# Patient Record
Sex: MALE | Race: WHITE | NOT HISPANIC OR LATINO | Employment: FULL TIME | URBAN - METROPOLITAN AREA
[De-identification: names, ages, dates, MRNs, and addresses within clinical notes are randomized per-mention and may not be internally consistent; named-entity substitution may affect disease eponyms.]

---

## 2017-03-21 ENCOUNTER — OFFICE VISIT (OUTPATIENT)
Dept: URGENT CARE | Facility: PHYSICIAN GROUP | Age: 32
End: 2017-03-21
Payer: COMMERCIAL

## 2017-03-21 VITALS
HEIGHT: 72 IN | HEART RATE: 90 BPM | BODY MASS INDEX: 25.6 KG/M2 | RESPIRATION RATE: 16 BRPM | TEMPERATURE: 99.1 F | DIASTOLIC BLOOD PRESSURE: 80 MMHG | WEIGHT: 189 LBS | OXYGEN SATURATION: 98 % | SYSTOLIC BLOOD PRESSURE: 110 MMHG

## 2017-03-21 DIAGNOSIS — J01.00 ACUTE NON-RECURRENT MAXILLARY SINUSITIS: ICD-10-CM

## 2017-03-21 DIAGNOSIS — J40 BRONCHITIS: ICD-10-CM

## 2017-03-21 PROCEDURE — 99203 OFFICE O/P NEW LOW 30 MIN: CPT | Performed by: PHYSICIAN ASSISTANT

## 2017-03-21 RX ORDER — DOXYCYCLINE HYCLATE 100 MG
100 TABLET ORAL 2 TIMES DAILY
Qty: 20 TAB | Refills: 0 | Status: SHIPPED | OUTPATIENT
Start: 2017-03-21 | End: 2017-03-31

## 2017-03-21 RX ORDER — CODEINE PHOSPHATE AND GUAIFENESIN 10; 100 MG/5ML; MG/5ML
5 SOLUTION ORAL EVERY 4 HOURS PRN
Qty: 100 ML | Refills: 0 | Status: SHIPPED | OUTPATIENT
Start: 2017-03-21 | End: 2017-06-12

## 2017-03-21 ASSESSMENT — ENCOUNTER SYMPTOMS
SPUTUM PRODUCTION: 1
FEVER: 0
COUGH: 1
SHORTNESS OF BREATH: 0
PALPITATIONS: 0
WHEEZING: 0
HEMOPTYSIS: 0
SORE THROAT: 0
CHILLS: 0

## 2017-03-21 NOTE — PROGRESS NOTES
Subjective:      Barbara Bennett is a 31 y.o. male who presents with Cough            Cough  This is a new problem. Episode onset: 14 days ago. The problem has been gradually worsening. The problem occurs constantly. The cough is productive of sputum. Associated symptoms include ear congestion. Pertinent negatives include no chest pain, chills, ear pain, fever, hemoptysis, sore throat, shortness of breath or wheezing. Nothing aggravates the symptoms. He has tried OTC cough suppressant for the symptoms. The treatment provided no relief.       Review of Systems   Constitutional: Positive for malaise/fatigue. Negative for fever and chills.   HENT: Positive for congestion. Negative for ear pain and sore throat.    Respiratory: Positive for cough and sputum production. Negative for hemoptysis, shortness of breath and wheezing.    Cardiovascular: Negative for chest pain and palpitations.     All other systems reviewed and are negative.  PMH:  has no past medical history of Diabetes or ASTHMA.  MEDS:   Current outpatient prescriptions:   •  guaifenesin-codeine (CHERATUSSIN AC) Solution oral solution, Take 5 mL by mouth every four hours as needed for Cough., Disp: 100 mL, Rfl: 0  •  PRILOSEC 20 MG CPDR, TAKE (1) CAPSULE BY MOUTH ONCE DAILY, Disp: 30 Each, Rfl: 6  •  omeprazole (PRILOSEC) 20 MG CPDR, Take 20 mg by mouth every day., Disp: , Rfl:   •  hydrocodone-acetaminophen (VICODIN) 5-500 MG TABS, Take 1-2 Tabs by mouth every 6 hours as needed., Disp: 45 Each, Rfl: 0  •  VICODIN 5-500 MG TABS, TAKE 1-2 TABLETS BY MOUTH EVERY 4 HOURS AS NEEDED FOR PAIN., Disp: 45 Each, Rfl: 0  •  omeprazole (PRILOSEC) 20 MG CPDR, Take 1 Cap by mouth every day., Disp: 90 Cap, Rfl: 3  •  promethazine-codeine (PHENERGAN-CODEINE) 6.25-10 MG/5ML SYRP, Take 5 mL by mouth 4 times a day as needed for Cough., Disp: 120 mL, Rfl: 0  •  azithromycin (ZITHROMAX) 250 MG TABS, Take  by mouth every day. 2 tabs by mouth day 1, 1 tab by mouth days 2-5, Disp: 1  Each, Rfl: 0  ALLERGIES: No Known Allergies  SURGHX: History reviewed. No pertinent past surgical history.  SOCHX:  reports that he has been smoking.  He does not have any smokeless tobacco history on file. He reports that he does not drink alcohol.  FH: Family history was reviewed, no pertinent findings to report  Medications, Allergies, and current problem list reviewed today in Epic       Objective:     /80 mmHg  Pulse 90  Temp(Src) 37.3 °C (99.1 °F)  Resp 16  Ht 1.829 m (6')  Wt 85.73 kg (189 lb)  BMI 25.63 kg/m2  SpO2 98%     Physical Exam   Constitutional: He is oriented to person, place, and time. He appears well-developed and well-nourished.   HENT:   Head: Normocephalic and atraumatic.   Right Ear: Hearing, tympanic membrane, external ear and ear canal normal.   Left Ear: Hearing, tympanic membrane, external ear and ear canal normal.   Nose: Nose normal.   Mouth/Throat: Uvula is midline, oropharynx is clear and moist and mucous membranes are normal.   Neck: Normal range of motion. Neck supple.   Cardiovascular: Normal rate, regular rhythm and normal heart sounds.  Exam reveals no gallop and no friction rub.    No murmur heard.  Pulmonary/Chest: Effort normal and breath sounds normal. No accessory muscle usage. No apnea, no tachypnea and no bradypnea. No respiratory distress. He has no decreased breath sounds. He has no wheezes. He has no rhonchi. He has no rales. He exhibits no tenderness.   Neurological: He is alert and oriented to person, place, and time.   Skin: Skin is warm and dry.   Psychiatric: He has a normal mood and affect. His behavior is normal. Judgment and thought content normal.   Vitals reviewed.              Assessment/Plan:     1. Acute non-recurrent maxillary sinusitis  doxycycline (VIBRAMYCIN) 100 MG Tab   2. Bronchitis  guaifenesin-codeine (CHERATUSSIN AC) Solution oral solution    doxycycline (VIBRAMYCIN) 100 MG Tab       Differential diagnosis, natural history, supportive  care, and indications for immediate follow-up discussed at length.   Follow-up with primary care provider within 4-5 days, emergency room precautions discussed.  Patient and/or family appears understanding of information.

## 2017-03-21 NOTE — MR AVS SNAPSHOT
Barbara Bennett   3/21/2017 10:50 AM   Office Visit   MRN: [id number]    Department:  [facility]   Dept Phone:  [phone]    Description:  Male : 1985   Provider:  Kade Grier PA-C           Reason for Visit     Cough chest congestion, nasal drainage, bloody noses, dizziness, vomiting, chills X 2 weeks       Allergies as of 3/21/2017     No Known Allergies      You were diagnosed with     Acute non-recurrent maxillary sinusitis   [6419620]       Bronchitis   [[id number]]         Vital Signs     Blood Pressure Pulse Temperature Respirations Height Weight    110/80 mmHg 90 37.3 °C (99.1 °F) 16 1.829 m (6') 85.73 kg (189 lb)    Body Mass Index Oxygen Saturation Smoking Status             25.63 kg/m2 98% Current Every Day Smoker         Basic Information     Date Of Birth Sex Race Ethnicity Preferred Language    1985 Male White Non- English      Health Maintenance        Date Due Completion Dates    IMM DTaP/Tdap/Td Vaccine (1 - Tdap) 2003 ---    IMM INFLUENZA (1) 2016 ---            Current Immunizations     No immunizations on file.      Below and/or attached are the medications your provider expects you to take. Review all of your home medications and newly ordered medications with your provider and/or pharmacist. Follow medication instructions as directed by your provider and/or pharmacist. Please keep your medication list with you and share with your provider. Update the information when medications are discontinued, doses are changed, or new medications (including over-the-counter products) are added; and carry medication information at all times in the event of emergency situations     Allergies:  No Known Allergies          Medications  Valid as of: 2017 - 11:35 AM    Generic Name Brand Name Tablet Size Instructions for use    Azithromycin (Tab) ZITHROMAX 250 MG Take  by mouth every day. 2 tabs by mouth day 1, 1 tab by mouth days 2-5        Doxycycline  Hyclate (Tab) VIBRAMYCIN 100 MG Take 1 Tab by mouth 2 times a day for 10 days.        Guaifenesin-Codeine (Solution) ROBITUSSIN -10 mg/5mL Take 5 mL by mouth every four hours as needed for Cough.        Hydrocodone-Acetaminophen (Tab) VICODIN 5-500 MG TAKE 1-2 TABLETS BY MOUTH EVERY 4 HOURS AS NEEDED FOR PAIN.        Hydrocodone-Acetaminophen (Tab) VICODIN 5-500 MG Take 1-2 Tabs by mouth every 6 hours as needed.        Omeprazole (CAPSULE DELAYED RELEASE) PRILOSEC 20 MG Take 20 mg by mouth every day.        Omeprazole (CAPSULE DELAYED RELEASE) PRILOSEC 20 MG Take 1 Cap by mouth every day.        Omeprazole (CAPSULE DELAYED RELEASE) PRILOSEC 20 MG TAKE (1) CAPSULE BY MOUTH ONCE DAILY        Promethazine-Codeine (Syrup) PHENERGAN-CODEINE 6.25-10 MG/5ML Take 5 mL by mouth 4 times a day as needed for Cough.        .                 Medicines prescribed today were sent to:     [id number] - [place] - [address]    [address] [place] [address]    Phone: [phone] Fax: [phone]    Open 24 Hours?: No    [facility] #[id number] - [place] - [address]    [address] [address]    Phone: [phone] Fax: [phone]    Open 24 Hours?: No      Medication refill instructions:       If your prescription bottle indicates you have medication refills left, it is not necessary to call your provider’s office. Please contact your pharmacy and they will refill your medication.    If your prescription bottle indicates you do not have any refills left, you may request refills at any time through one of the following ways: The online [employer] system (except Urgent Care), by calling your provider’s office, or by asking your pharmacy to contact your provider’s office with a refill request. Medication refills are processed only during regular business hours and may not be available until the next business day. Your provider may request additional information or to have a follow-up visit  with you prior to refilling your medication.   *Please Note: Medication refills are assigned a new Rx number when refilled electronically. Your pharmacy may indicate that no refills were authorized even though a new prescription for the same medication is available at the pharmacy. Please request the medicine by name with the pharmacy before contacting your provider for a refill.           Localler Access Code: [email or web address]  Expires: 4/20/2017 11:35 AM    Localler  A secure, online tool to manage your health information     [employer]s Localler® is a secure, online tool that connects you to your personalized health information from the privacy of your home -- day or night - making it very easy for you to manage your healthcare. Once the activation process is completed, you can even access your medical information using the Localler genoveva, which is available for free in the [employer] Genoveva store or Google Play store.     Localler provides the following levels of access (as shown below):   My Chart Features   [facility] Primary Care Doctor [facility]  Specialists [facility]  Urgent  Care Non-[facility]  Primary Care  Doctor   Email your healthcare team securely and privately 24/7 X X X    Manage appointments: schedule your next appointment; view details of past/upcoming appointments X      Request prescription refills. X      View recent personal medical records, including lab and immunizations X X X X   View health record, including health history, allergies, medications X X X X   Read reports about your outpatient visits, procedures, consult and ER notes X X X X   See your discharge summary, which is a recap of your hospital and/or ER visit that includes your diagnosis, lab results, and care plan. X X       How to register for Localler:  1. Go to  [email or web address].  2. Click on the Sign Up Now box, which takes you to the New Member Sign Up page. You will need to provide the following information:  a. Enter your Localler Access  Code exactly as it appears at the top of this page. (You will not need to use this code after you’ve completed the sign-up process. If you do not sign up before the expiration date, you must request a new code.)   b. Enter your date of birth.   c. Enter your home email address.   d. Click Submit, and follow the next screen’s instructions.  3. Create a [employer] ID. This will be your Ohmconnect login ID and cannot be changed, so think of one that is secure and easy to remember.  4. Create a [employer] password. You can change your password at any time.  5. Enter your Password Reset Question and Answer. This can be used at a later time if you forget your password.   6. Enter your e-mail address. This allows you to receive e-mail notifications when new information is available in Ohmconnect.  7. Click Sign Up. You can now view your health information.    For assistance activating your Ohmconnect account, call [phone]        Quit Tobacco Information     Do you want to quit using tobacco?    Quitting tobacco decreases risks of cancer, heart and lung disease, increases life expectancy, improves sense of taste and smell, and increases spending money, among other benefits.    If you are thinking about quitting, we can help.  • [facility] Quit Tobacco Program: 130.392.8475  o Program occurs weekly for four weeks and includes pharmacist consultation on products to support quitting smoking or chewing tobacco. A provider referral is needed for pharmacist consultation.  • Tobacco Users Help Hotline: 3-800-QUIT-NOW (039-1623) or https://[place].quitlogix.org/  o Free, confidential telephone and online coaching for [place] residents. Sessions are designed on a schedule that is convenient for you. Eligible clients receive free nicotine replacement therapy.  • Nationally: www.smokefree.gov  o Information and professional assistance to support both immediate and long-term needs as you become, and remain, a non-smoker. Smokefree.gov allows you to  choose the help that best fits your needs.

## 2017-06-12 ENCOUNTER — APPOINTMENT (OUTPATIENT)
Dept: ADMISSIONS | Facility: MEDICAL CENTER | Age: 32
End: 2017-06-12
Attending: SURGERY
Payer: COMMERCIAL

## 2017-06-14 ENCOUNTER — HOSPITAL ENCOUNTER (OUTPATIENT)
Facility: MEDICAL CENTER | Age: 32
End: 2017-06-14
Attending: SURGERY | Admitting: SURGERY
Payer: COMMERCIAL

## 2017-06-14 VITALS
TEMPERATURE: 97.7 F | WEIGHT: 196.87 LBS | DIASTOLIC BLOOD PRESSURE: 70 MMHG | HEART RATE: 55 BPM | HEIGHT: 72 IN | SYSTOLIC BLOOD PRESSURE: 107 MMHG | OXYGEN SATURATION: 96 % | RESPIRATION RATE: 16 BRPM | BODY MASS INDEX: 26.67 KG/M2

## 2017-06-14 PROBLEM — M25.821: Status: ACTIVE | Noted: 2017-06-14

## 2017-06-14 PROCEDURE — A9270 NON-COVERED ITEM OR SERVICE: HCPCS

## 2017-06-14 PROCEDURE — 160002 HCHG RECOVERY MINUTES (STAT): Performed by: SURGERY

## 2017-06-14 PROCEDURE — 160029 HCHG SURGERY MINUTES - 1ST 30 MINS LEVEL 4: Performed by: SURGERY

## 2017-06-14 PROCEDURE — 700111 HCHG RX REV CODE 636 W/ 250 OVERRIDE (IP)

## 2017-06-14 PROCEDURE — 88304 TISSUE EXAM BY PATHOLOGIST: CPT

## 2017-06-14 PROCEDURE — 160036 HCHG PACU - EA ADDL 30 MINS PHASE I: Performed by: SURGERY

## 2017-06-14 PROCEDURE — 500881 HCHG PACK, EXTREMITY: Performed by: SURGERY

## 2017-06-14 PROCEDURE — 160048 HCHG OR STATISTICAL LEVEL 1-5: Performed by: SURGERY

## 2017-06-14 PROCEDURE — 160009 HCHG ANES TIME/MIN: Performed by: SURGERY

## 2017-06-14 PROCEDURE — 501480 HCHG STOCKINETTE: Performed by: SURGERY

## 2017-06-14 PROCEDURE — 502240 HCHG MISC OR SUPPLY RC 0272: Performed by: SURGERY

## 2017-06-14 PROCEDURE — 700102 HCHG RX REV CODE 250 W/ 637 OVERRIDE(OP)

## 2017-06-14 PROCEDURE — 501445 HCHG STAPLER, SKIN DISP: Performed by: SURGERY

## 2017-06-14 PROCEDURE — 700101 HCHG RX REV CODE 250

## 2017-06-14 PROCEDURE — 501838 HCHG SUTURE GENERAL: Performed by: SURGERY

## 2017-06-14 PROCEDURE — 160041 HCHG SURGERY MINUTES - EA ADDL 1 MIN LEVEL 4: Performed by: SURGERY

## 2017-06-14 PROCEDURE — 160035 HCHG PACU - 1ST 60 MINS PHASE I: Performed by: SURGERY

## 2017-06-14 RX ORDER — MAGNESIUM HYDROXIDE 1200 MG/15ML
LIQUID ORAL
Status: DISCONTINUED | OUTPATIENT
Start: 2017-06-14 | End: 2017-06-14 | Stop reason: HOSPADM

## 2017-06-14 RX ORDER — OXYCODONE HYDROCHLORIDE AND ACETAMINOPHEN 5; 325 MG/1; MG/1
TABLET ORAL
Status: COMPLETED
Start: 2017-06-14 | End: 2017-06-14

## 2017-06-14 RX ORDER — BUPIVACAINE HYDROCHLORIDE AND EPINEPHRINE 5; 5 MG/ML; UG/ML
INJECTION, SOLUTION EPIDURAL; INTRACAUDAL; PERINEURAL
Status: DISCONTINUED | OUTPATIENT
Start: 2017-06-14 | End: 2017-06-14 | Stop reason: HOSPADM

## 2017-06-14 RX ORDER — HYDROCODONE BITARTRATE AND ACETAMINOPHEN 5; 325 MG/1; MG/1
1-2 TABLET ORAL EVERY 4 HOURS PRN
Qty: 20 TAB | Refills: 0 | Status: SHIPPED | OUTPATIENT
Start: 2017-06-14 | End: 2018-10-11

## 2017-06-14 RX ORDER — OMEPRAZOLE 40 MG/1
40 CAPSULE, DELAYED RELEASE ORAL DAILY
COMMUNITY

## 2017-06-14 RX ORDER — CEPHALEXIN 500 MG/1
500 CAPSULE ORAL 3 TIMES DAILY
COMMUNITY
Start: 2017-06-06 | End: 2018-10-11

## 2017-06-14 RX ORDER — SODIUM CHLORIDE, SODIUM LACTATE, POTASSIUM CHLORIDE, CALCIUM CHLORIDE 600; 310; 30; 20 MG/100ML; MG/100ML; MG/100ML; MG/100ML
1000 INJECTION, SOLUTION INTRAVENOUS
Status: COMPLETED | OUTPATIENT
Start: 2017-06-14 | End: 2017-06-14

## 2017-06-14 RX ADMIN — SODIUM CHLORIDE, SODIUM LACTATE, POTASSIUM CHLORIDE, CALCIUM CHLORIDE 1000 ML: 600; 310; 30; 20 INJECTION, SOLUTION INTRAVENOUS at 14:43

## 2017-06-14 RX ADMIN — FENTANYL CITRATE 50 MCG: 50 INJECTION, SOLUTION INTRAMUSCULAR; INTRAVENOUS at 17:15

## 2017-06-14 RX ADMIN — OXYCODONE AND ACETAMINOPHEN 2 TABLET: 5; 325 TABLET ORAL at 17:00

## 2017-06-14 RX ADMIN — FENTANYL CITRATE 50 MCG: 50 INJECTION, SOLUTION INTRAMUSCULAR; INTRAVENOUS at 17:00

## 2017-06-14 ASSESSMENT — PAIN SCALES - GENERAL
PAINLEVEL_OUTOF10: 4
PAINLEVEL_OUTOF10: 0
PAINLEVEL_OUTOF10: 4

## 2017-06-14 NOTE — PROGRESS NOTES
Med rec complete per Pt at bedside  Allergies reviewed  Pt is currently on a 10 day course of Cephalexin started on 6/6

## 2017-06-14 NOTE — IP AVS SNAPSHOT
6/14/2017    Barbara Bennett  [address] [address]    Dear Barbara:    [facility] wants to ensure your discharge home is safe and you or your loved ones have had all of your questions answered regarding your care after you leave the hospital.    Below is a list of resources and contact information should you have any questions regarding your hospital stay, follow-up instructions, or active medical symptoms.    Questions or Concerns Regarding… Contact   Medical Questions Related to Your Discharge  (7 days a week, 8am-5pm) Contact a Nurse Care Coordinator   [phone]   Medical Questions Not Related to Your Discharge  (24 hours a day / 7 days a week)  Contact the Nurse Health Line   [phone]    Medications or Discharge Instructions Refer to your discharge packet   or contact your [facility] Primary Care Provider   [phone]   Follow-up Appointment(s) Schedule your appointment via [employer]le   or contact Scheduling [phone]   Billing Review your statement via [employer]  or contact Billing [phone]   Medical Records Review your records via [employer]   or contact Medical Records [phone]     You may receive a telephone call within two days of discharge. This call is to make certain you understand your discharge instructions and have the opportunity to have any questions answered. You can also easily access your medical information, test results and upcoming appointments via the [employer] free online health management tool. You can learn more and sign up at [employer] Doodle. For assistance setting up your L[employer] Doodle account, please call [phone].    Once again, we want to ensure your discharge home is safe and that you have a clear understanding of any next steps in your care. If you have any questions or concerns, please do not hesitate to contact us, we are here for you. Thank you for choosing [facility] for your healthcare needs.    Sincerely,    Your [facility] Healthcare Team

## 2017-06-14 NOTE — OR SURGEON
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    Anesthesiologist/Type of Anesthesia:  Anesthesiologist: Poncho Molina M.D./General    Surgical Staff:  Circulator: Makayla Crump R.N.  Scrub Person: Antonio Plascencia Jr.  First Assist: Darryl Fernandes PA-C    Specimen: none    Estimated Blood Loss: minimal    Findings: see dictation    Complications:  None noted.         6/14/2017 4:44 PM Bryce Min

## 2017-06-14 NOTE — IP AVS SNAPSHOT
Home Care Instructions                                                                                                                Name:Barbara Bennett  Medical Record Number:[id number]  CSN: [id number]    YOB: 1985   Age: 31 y.o.  Sex: male  HT:1.829 m (6') WT: 89.3 kg (196 lb 13.9 oz)          Admit Date: 6/14/2017     Discharge Date:   Today's Date: 6/14/2017  Attending Doctor:  JELLY Santos*                  Allergies:  Review of patient's allergies indicates no known allergies.                Discharge Instructions         ACTIVITY: Rest and take it easy for the first 24 hours.  A responsible adult is recommended to remain with you during that time.  It is normal to feel sleepy.  We encourage you to not do anything that requires balance, judgment or coordination.    MILD FLU-LIKE SYMPTOMS ARE NORMAL. YOU MAY EXPERIENCE GENERALIZED MUSCLE ACHES, THROAT IRRITATION, HEADACHE AND/OR SOME NAUSEA.    FOR 24 HOURS DO NOT:  Drive, operate machinery or run household appliances.  Drink beer or alcoholic beverages.   Make important decisions or sign legal documents.    SPECIAL INSTRUCTIONS:     DIET: To avoid nausea, slowly advance diet as tolerated, avoiding spicy or greasy foods for the first day.  Add more substantial food to your diet according to your physician's instructions.  Babies can be fed formula or breast milk as soon as they are hungry.  INCREASE FLUIDS AND FIBER TO AVOID CONSTIPATION.    SURGICAL DRESSING/BATHING:   Okay to remove bandage in 4 days and get incision wet and cover with bandaids.     Elevate above heart and ice frequently for at least 2 weeks. Encourage sedentary use of hand and frequent moving of fingers to prevent stiffness.     No heavy lifting or grasping for at least 4 weeks.     No driving while on narcotic pain medications. Contact auto-insurance carrier for clearance to begin driving again.     Call MD or come to ER for any major concerns.    FOLLOW-UP  APPOINTMENT:  Office will call you with follow up appointment. [facility] # is  [phone]    You should CALL YOUR PHYSICIAN if you develop:  Fever greater than 101 degrees F.  Pain not relieved by medication, or persistent nausea or vomiting.  Excessive bleeding (blood soaking through dressing) or unexpected drainage from the wound.  Extreme redness or swelling around the incision site, drainage of pus or foul smelling drainage.  Inability to urinate or empty your bladder within 8 hours.  Problems with breathing or chest pain.    You should call 911 if you develop problems with breathing or chest pain.  If you are unable to contact your doctor or surgical center, you should go to the nearest emergency room or urgent care center.  Physician's telephone #: Dr Min at the [facility] clinic.    If any questions arise, call your doctor.  If your doctor is not available, please feel free to call the Surgical Center at {Surgical Dept Numbers:[id number]}.  The Center is open Monday through Friday from 7AM to 7PM.  You can also call the HEALTH HOTLINE open 24 hours/day, 7 days/week and speak to a nurse at [phone], or toll free at [phone].    A registered nurse may call you a few days after your surgery to see how you are doing after your procedure.    MEDICATIONS: Resume taking daily medication.  Take prescribed pain medication with food.  If no medication is prescribed, you may take non-aspirin pain medication if needed.  PAIN MEDICATION CAN BE VERY CONSTIPATING.  Take a stool softener or laxative such as senokot, pericolace, or milk of magnesia if needed.    Prescription given for Norco.  Last pain medication given at 5:00pm.    If your physician has prescribed pain medication that includes Acetaminophen (Tylenol), do not take additional Acetaminophen (Tylenol) while taking the prescribed medication.    Depression / Suicide Risk    As you are discharged from this [facility] facility, it is important to learn  how to keep safe from harming yourself.    Recognize the warning signs:  · Abrupt changes in personality, positive or negative- including increase in energy   · Giving away possessions  · Change in eating patterns- significant weight changes-  positive or negative  · Change in sleeping patterns- unable to sleep or sleeping all the time   · Unwillingness or inability to communicate  · Depression  · Unusual sadness, discouragement and loneliness  · Talk of wanting to die  · Neglect of personal appearance   · Rebelliousness- reckless behavior  · Withdrawal from people/activities they love  · Confusion- inability to concentrate     If you or a loved one observes any of these behaviors or has concerns about self-harm, here's what you can do:  · Talk about it- your feelings and reasons for harming yourself  · Remove any means that you might use to hurt yourself (examples: pills, rope, extension cords, firearm)  · Get professional help from the community (Mental Health, Substance Abuse, psychological counseling)  · Do not be alone:Call your Safe Contact- someone whom you trust who will be there for you.  · Call your local CRISIS HOTLINE 436-7867 or 336-671-3308  · Call your local Children's Mobile Crisis Response Team [place] (369) 392-9419 or www.ClassLink  · Call the toll free National Suicide Prevention Hotlines   · National Suicide Prevention Lifeline 892-346-WLXX (0460)  · National Hope Line Network 800-SUICIDE (641-3690)       Medication List      START taking these medications        Instructions    Morning Afternoon Evening Bedtime    hydrocodone-acetaminophen 5-325 MG Tabs per tablet   Commonly known as:  NORCO        Take 1-2 Tabs by mouth every four hours as needed.   Dose:  1-2 Tab                          CONTINUE taking these medications        Instructions    Morning Afternoon Evening Bedtime    cephALEXin 500 MG Caps   Commonly known as:  KEFLEX        Take 500 mg by mouth 3 times a day. Pt  started a 10 day course on 6/6   Dose:  500 mg                        NAPROXEN PO        Take 1 Tab by mouth as needed.   Dose:  1 Tab                        omeprazole 40 MG delayed-release capsule   Commonly known as:  PRILOSEC        Take 40 mg by mouth every day.   Dose:  40 mg                             Where to Get Your Medications      You can get these medications from any pharmacy     Bring a paper prescription for each of these medications    - hydrocodone-acetaminophen 5-325 MG Tabs per tablet            Medication Information     Above and/or attached are the medications your physician expects you to take upon discharge. Review all of your home medications and newly ordered medications with your doctor and/or pharmacist. Follow medication instructions as directed by your doctor and/or pharmacist. Please keep your medication list with you and share with your physician. Update the information when medications are discontinued, doses are changed, or new medications (including over-the-counter products) are added; and carry medication information at all times in the event of emergency situations.        Resources     Quit Smoking / Tobacco Use:    I understand the use of any tobacco products increases my chance of suffering from future heart disease or stroke and could cause other illnesses which may shorten my life. Quitting the use of tobacco products is the single most important thing I can do to improve my health. For further information on smoking / tobacco cessation call a Toll Free Quit Line at 1-612.185.1940 (*[employer] [place]) or 1-685.219.7389 (American Lung Association) or you can access the web based program at www.lungusa.org.    [place] Tobacco Users Help Line:  (413) 258-5522       Toll Free: 1-180.283.8815  Quit Tobacco Program [facility] (010)396-0115    Crisis Hotline:    [place] Crisis Hotline:  9-016-JSUKKUI or 1-686.946.3180    [place] Crisis Hotline:     8-825-259-9035 or 481-648-8242    Discharge Survey:   Thank you for choosing [facility]. We hope we did everything we could to make your hospital stay a pleasant one. You may be receiving a survey and we would appreciate your time and participation in answering the questions. Your input is very valuable to us in our efforts to improve our service to our patients and their families.            Signatures     My signature on this form indicates that:    1. I acknowledge receipt and understanding of these Home Care Instruction.  2. My questions regarding this information have been answered to my satisfaction.  3. I have formulated a plan with my discharge nurse to obtain my prescribed medications for home.    __________________________________      __________________________________                   Patient Signature                                 Guardian/Responsible Adult Signature      __________________________________                 __________       ________                       Nurse Signature                                               Date                 Time

## 2017-06-15 NOTE — DISCHARGE INSTRUCTIONS
0  ACTIVITY: Rest and take it easy for the first 24 hours.  A responsible adult is recommended to remain with you during that time.  It is normal to feel sleepy.  We encourage you to not do anything that requires balance, judgment or coordination.    MILD FLU-LIKE SYMPTOMS ARE NORMAL. YOU MAY EXPERIENCE GENERALIZED MUSCLE ACHES, THROAT IRRITATION, HEADACHE AND/OR SOME NAUSEA.    FOR 24 HOURS DO NOT:  Drive, operate machinery or run household appliances.  Drink beer or alcoholic beverages.   Make important decisions or sign legal documents.    SPECIAL INSTRUCTIONS:     DIET: To avoid nausea, slowly advance diet as tolerated, avoiding spicy or greasy foods for the first day.  Add more substantial food to your diet according to your physician's instructions.  Babies can be fed formula or breast milk as soon as they are hungry.  INCREASE FLUIDS AND FIBER TO AVOID CONSTIPATION.    SURGICAL DRESSING/BATHING:   Okay to remove bandage in 4 days and get incision wet and cover with bandaids.     Elevate above heart and ice frequently for at least 2 weeks. Encourage sedentary use of hand and frequent moving of fingers to prevent stiffness.     No heavy lifting or grasping for at least 4 weeks.     No driving while on narcotic pain medications. Contact auto-insurance carrier for clearance to begin driving again.     Call MD or come to ER for any major concerns.    FOLLOW-UP APPOINTMENT:  Office will call you with follow up appointment. [facility] # is [phone]    You should CALL YOUR PHYSICIAN if you develop:  Fever greater than 101 degrees F.  Pain not relieved by medication, or persistent nausea or vomiting.  Excessive bleeding (blood soaking through dressing) or unexpected drainage from the wound.  Extreme redness or swelling around the incision site, drainage of pus or foul smelling drainage.  Inability to urinate or empty your bladder within 8 hours.  Problems with breathing or chest pain.    You should call 911 if  you develop problems with breathing or chest pain.  If you are unable to contact your doctor or surgical center, you should go to the nearest emergency room or urgent care center.  Physician's telephone #: Dr Min at the [facility] clinic.    If any questions arise, call your doctor.  If your doctor is not available, please feel free to call the Surgical Center at {Surgical Dept Numbers:[id number]}.  The Center is open Monday through Friday from 7AM to 7PM.  You can also call the HEALTH HOTLINE open 24 hours/day, 7 days/week and speak to a nurse at [phone], or toll free at [phone].    A registered nurse may call you a few days after your surgery to see how you are doing after your procedure.    MEDICATIONS: Resume taking daily medication.  Take prescribed pain medication with food.  If no medication is prescribed, you may take non-aspirin pain medication if needed.  PAIN MEDICATION CAN BE VERY CONSTIPATING.  Take a stool softener or laxative such as senokot, pericolace, or milk of magnesia if needed.    Prescription given for Norco.  Last pain medication given at 5:00pm.    If your physician has prescribed pain medication that includes Acetaminophen (Tylenol), do not take additional Acetaminophen (Tylenol) while taking the prescribed medication.    Depression / Suicide Risk    As you are discharged from this [facility] facility, it is important to learn how to keep safe from harming yourself.    Recognize the warning signs:  · Abrupt changes in personality, positive or negative- including increase in energy   · Giving away possessions  · Change in eating patterns- significant weight changes-  positive or negative  · Change in sleeping patterns- unable to sleep or sleeping all the time   · Unwillingness or inability to communicate  · Depression  · Unusual sadness, discouragement and loneliness  · Talk of wanting to die  · Neglect of personal appearance   · Rebelliousness- reckless behavior  · Withdrawal  from people/activities they love  · Confusion- inability to concentrate     If you or a loved one observes any of these behaviors or has concerns about self-harm, here's what you can do:  · Talk about it- your feelings and reasons for harming yourself  · Remove any means that you might use to hurt yourself (examples: pills, rope, extension cords, firearm)  · Get professional help from the community (Mental Health, Substance Abuse, psychological counseling)  · Do not be alone:Call your Safe Contact- someone whom you trust who will be there for you.  · Call your local CRISIS HOTLINE 056-8327 or 432-813-6355  · Call your local Children's Mobile Crisis Response Team [place] (516) 938-5571 or www.[employer]  · Call the toll free National Suicide Prevention Hotlines   · National Suicide Prevention Lifeline 331-982-FROL (0070)  · National Hope Line Network 800-SUICIDE (134-0818)

## 2017-06-15 NOTE — OP REPORT
DATE OF SERVICE:  06/14/2017    SURGEON:  Bryce Min MD    ASSISTANT:  Darryl Fernandes PA-C    PREOPERATIVE DIAGNOSES:  1.  Right olecranon enthesophyte painful mass.  2.  Right proximal radial forearm mass (approximately 3x4 cm in size),   possible granuloma.    POSTOPERATIVE DIAGNOSES:  1.  Right olecranon enthesophyte painful mass.  2.  Right proximal radial forearm mass (approximately 3x4 cm in size),   possible granuloma.    PROCEDURES:  1.  Right olecranon enthesophyte excision.  2.  Right proximal radial forearm mass excision (approximately 4x3 cm in size,   superficial, subcutaneous).    ANESTHESIOLOGIST:  Poncho Molina MD    ANESTHESIA:  General endotracheal anesthesia.    COMPLICATIONS:  None noted.    DRAINS:  None.    SPECIMENS:  Proximal radial forearm mass x1.    ESTIMATED BLOOD LOSS:  Minimal.    TOURNIQUET TIME:  Less than 30 minutes at 250 mmHg.    INDICATIONS:  The patient is a 31-year-old gentleman well known to me through   my outpatient clinic for the above-mentioned diagnoses.  He believes and   agrees.  He failed conservative treatment and is a candidate for the   above-mentioned procedures.  Prior to procedure, he understood the risks,   benefits and alternatives to surgery.  He understood the risks to include, but   not limited to the risk of infection, requiring repeat surgery, bleeding   requiring blood transfusion, nerve, blood vessel, tendon injury requiring   repair, chronic pain, recurrence of mass, elbow stiffness, requiring revision   surgery, DVT, pulmonary embolism, heart attack, stroke, even death.  Patient   states despite these risks, he wished to proceed with surgery.    DESCRIPTION OF PROCEDURE:  Patient was met in the preoperative holding area.    His right elbow and his right proximal radial forearm were initialed as   correct operative site.  He had an opportunity to ask questions, all questions   were answered and informed consent was obtained.  Patient  was brought to the   operating room and laid supine on the operating table.  All bony and dependent   prominences were well padded.  General endotracheal anesthesia was induced   without noted complication.  Ancef was administered for infection prophylaxis.    Right upper extremity was prepped and draped in the usual sterile fashion.    A formal time-out was performed, which all parties agreed upon the correct   patient, procedure, and operative site.  I began the procedure by making   approximately 3 cm longitudinal incision overlying the tip of the olecranon   where he had a palpable painful large enthesophyte with multiple small areas   of hypertrophic scar around the enthesophyte, which was approximately 8x8 mm   in size.  This was excised with Bovie cautery and rongeur down to smooth   contour of olecranon bone while preserving the triceps tendon.    Through separate this incision, I made approximately 6 cm oblique incision   over the proximal radial forearm and visible and palpable mass and dissected   down to subcutaneous tissues.  It was subcutaneous tissue where the patient   many years ago and believes he has a small piece of wood foreign body breaks   off in side of his arm.  He then believes he has developed a foreign body   granuloma around this.  I used electrocautery device and scalpel to dissect   the mass off of the subcutaneous tissues and the fascial layer was sent for   pathology.  There was no purulence or evidence of infection.  I then copiously   irrigated both wounds with normal saline, closed both incisions with   interrupted 2-0 Monocryl suture and skin stapler, covered with sterile   Xeroform, 4x4s, and a well-padded soft dressing.  The patient awoke from   anesthesia without noted complication and was transferred in stable condition   to PACU where he had no immediate post-term complaints.    POSTOPERATIVE PLAN:  The patient will be discharged home per same day   criteria, sedentary use  of the upper extremity and follow up in clinic in   10-14 days for staple removal and wound check.       ____________________________________     MD BIJAN Azar / DANIEL    DD:  06/14/2017 16:52:00  DT:  06/14/2017 18:20:22    D#:  [id number]  Job#:  [id number]

## 2018-10-11 ENCOUNTER — OFFICE VISIT (OUTPATIENT)
Dept: MEDICAL GROUP | Age: 33
End: 2018-10-11
Payer: COMMERCIAL

## 2018-10-11 ENCOUNTER — HOSPITAL ENCOUNTER (OUTPATIENT)
Dept: RADIOLOGY | Facility: MEDICAL CENTER | Age: 33
End: 2018-10-11
Attending: PHYSICIAN ASSISTANT
Payer: COMMERCIAL

## 2018-10-11 VITALS
HEART RATE: 77 BPM | SYSTOLIC BLOOD PRESSURE: 100 MMHG | DIASTOLIC BLOOD PRESSURE: 60 MMHG | TEMPERATURE: 97.9 F | BODY MASS INDEX: 27.38 KG/M2 | HEIGHT: 71 IN | WEIGHT: 195.6 LBS

## 2018-10-11 DIAGNOSIS — F17.210 CIGARETTE NICOTINE DEPENDENCE WITHOUT COMPLICATION: ICD-10-CM

## 2018-10-11 DIAGNOSIS — M25.551 PAIN OF RIGHT HIP JOINT: ICD-10-CM

## 2018-10-11 DIAGNOSIS — D22.5 MELANOCYTIC NEVI OF TRUNK: ICD-10-CM

## 2018-10-11 DIAGNOSIS — Z23 NEED FOR VACCINATION: ICD-10-CM

## 2018-10-11 PROCEDURE — 90471 IMMUNIZATION ADMIN: CPT | Performed by: PHYSICIAN ASSISTANT

## 2018-10-11 PROCEDURE — 90715 TDAP VACCINE 7 YRS/> IM: CPT | Performed by: PHYSICIAN ASSISTANT

## 2018-10-11 PROCEDURE — 73502 X-RAY EXAM HIP UNI 2-3 VIEWS: CPT | Mod: RT

## 2018-10-11 PROCEDURE — 90732 PPSV23 VACC 2 YRS+ SUBQ/IM: CPT | Performed by: PHYSICIAN ASSISTANT

## 2018-10-11 PROCEDURE — 90686 IIV4 VACC NO PRSV 0.5 ML IM: CPT | Performed by: PHYSICIAN ASSISTANT

## 2018-10-11 PROCEDURE — 99214 OFFICE O/P EST MOD 30 MIN: CPT | Mod: 25 | Performed by: PHYSICIAN ASSISTANT

## 2018-10-11 PROCEDURE — 90472 IMMUNIZATION ADMIN EACH ADD: CPT | Performed by: PHYSICIAN ASSISTANT

## 2018-10-11 RX ORDER — IBUPROFEN 800 MG/1
800 TABLET ORAL EVERY 8 HOURS PRN
COMMUNITY
End: 2023-04-20

## 2018-10-11 ASSESSMENT — PATIENT HEALTH QUESTIONNAIRE - PHQ9: CLINICAL INTERPRETATION OF PHQ2 SCORE: 0
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
## 2018-10-11 NOTE — ASSESSMENT & PLAN NOTE
The patient displays various melanocytic nevi across his trunk. He notes one in particular on his left anterior shoulder that is red, flaking and itchy for the past 2 weeks. He has another on the right shoulder that has changed shape in the past few months. His mother recently passed away 2/2 ocular melanoma and he is concerned that his might be malignant.

## 2018-10-11 NOTE — ASSESSMENT & PLAN NOTE
This is a chronic problem. The patient has smoked 1 PPD for the past 20 years. In the past he has tried various methods to quit including cold turkey, tapering off, nicotine patches and gum. None of these methods have worked.

## 2018-10-15 ENCOUNTER — TELEPHONE (OUTPATIENT)
Dept: MEDICAL GROUP | Age: 33
End: 2018-10-15

## 2018-10-15 NOTE — TELEPHONE ENCOUNTER
----- Message from Flor Brennan P.A.-C. sent at 10/11/2018  4:56 PM PDT -----  Please call the patient and let him know that his xray did not show any sign of bony changes like arthritis. Based on this, I have sent a referral to physical therapy. He should hear from them in one week, if he has not heard by 10/18/2018 he should call [phone] to schedule.

## 2018-10-16 ENCOUNTER — TELEPHONE (OUTPATIENT)
Dept: MEDICAL GROUP | Age: 33
End: 2018-10-16

## 2018-10-16 NOTE — TELEPHONE ENCOUNTER
MEDICATION PRIOR AUTHORIZATION NEEDED:    1. Name of Medication: Chantix    2. Requested By (Name of Pharmacy):   [id number] - [place] - [address]  [address] [address]  Phone: [phone] Fax: [phone]    3. Is insurance on file current? Yes    4. What is the name & phone number of the 3rd party payor? [employer]  (Cover MyMeds key: [id number])     Would you like to start Prior Auth?

## 2018-10-22 NOTE — TELEPHONE ENCOUNTER
DOCUMENTATION OF PAR STATUS:    1. Name of Medication & Dose: Chantix     Alternative generic medication can be prescribed without PA are: Bupropion HCI ER and Nicotine Polacrilex. Would you like to send RX for these alternative or continue with PA for Chantix ?

## 2018-10-23 ENCOUNTER — APPOINTMENT (OUTPATIENT)
Dept: PHYSICAL THERAPY | Facility: REHABILITATION | Age: 33
End: 2018-10-23
Attending: PHYSICIAN ASSISTANT
Payer: COMMERCIAL

## 2018-10-24 NOTE — TELEPHONE ENCOUNTER
DOCUMENTATION OF PAR STATUS:    1. Name of Medication & Dose: Chantix     2. Name of Prescription Coverage Company & phone #:   [id number] - [place] - [address] [place] [address]  Phone: [phone] Fax: [phone]          3. Date Prior Auth Submitted: 10/24/18    4. What information was given to obtain insurance decision? Faxed over EPIc last OV notes [phone]    5. Prior Auth Status? Pending    6. Patient Notified: yes-     Spoke with Luc at cover [employer] since I was unable to attach notes to PA form. Faxed fors to [phone]. Luc informed they will send a e-mail when they have received notes, they will attach to PA and then we can log back in to cover mymeds and hit send to plan.    New cover my med key for patient [id number] PA # [id number]

## 2018-11-01 ENCOUNTER — APPOINTMENT (OUTPATIENT)
Dept: MEDICAL GROUP | Age: 33
End: 2018-11-01
Payer: COMMERCIAL

## 2018-11-01 NOTE — TELEPHONE ENCOUNTER
FINAL PRIOR AUTHORIZATION STATUS:    1.  Name of Medication & Dose: Chantix      2. Prior Auth Status: Denied.  Reason: Patient did  not meet the established medcation-specific criteria or guideline for Chantix.  Forms scanned to media     3. Action Taken: Pharmacy Notified: yes Patient Notified: yes LVM for patient to inform- have sent to provider - pending advise for change

## 2018-11-02 NOTE — TELEPHONE ENCOUNTER
Please advise pt we do have smoking cessation classes which help with coverage for these medications--I will refer if they'd like.  Covering on behalf of Flor

## 2018-11-06 NOTE — TELEPHONE ENCOUNTER
Phone Number Called: [phone] (home)       Message: Called pt lvm to call us back regarding message below     Left Message for patient to call back: yes

## 2018-11-08 NOTE — TELEPHONE ENCOUNTER
Phone Number Called: [phone] (home)       Message: Called spoke with patient to inform of medication not covered, if he would like to have provider refer him to smoking calsses. Per patient he declined. He will reach out to his insurance company and speak with them     Left Message for patient to call back: no

## 2019-05-14 ENCOUNTER — OFFICE VISIT (OUTPATIENT)
Dept: URGENT CARE | Facility: PHYSICIAN GROUP | Age: 34
End: 2019-05-14
Payer: COMMERCIAL

## 2019-05-14 ENCOUNTER — HOSPITAL ENCOUNTER (OUTPATIENT)
Dept: RADIOLOGY | Facility: MEDICAL CENTER | Age: 34
End: 2019-05-14
Attending: NURSE PRACTITIONER
Payer: COMMERCIAL

## 2019-05-14 VITALS
BODY MASS INDEX: 28.35 KG/M2 | HEIGHT: 70 IN | DIASTOLIC BLOOD PRESSURE: 64 MMHG | RESPIRATION RATE: 14 BRPM | SYSTOLIC BLOOD PRESSURE: 102 MMHG | WEIGHT: 198 LBS | HEART RATE: 85 BPM | OXYGEN SATURATION: 95 % | TEMPERATURE: 98.1 F

## 2019-05-14 DIAGNOSIS — M25.531 RIGHT WRIST PAIN: ICD-10-CM

## 2019-05-14 DIAGNOSIS — M77.8 RIGHT WRIST TENDONITIS: ICD-10-CM

## 2019-05-14 PROCEDURE — 99214 OFFICE O/P EST MOD 30 MIN: CPT | Performed by: NURSE PRACTITIONER

## 2019-05-14 PROCEDURE — 73110 X-RAY EXAM OF WRIST: CPT | Mod: RT

## 2019-05-14 RX ORDER — METHYLPREDNISOLONE 4 MG/1
TABLET ORAL
Qty: 1 KIT | Refills: 0 | Status: SHIPPED | OUTPATIENT
Start: 2019-05-14 | End: 2023-04-20

## 2019-05-14 ASSESSMENT — ENCOUNTER SYMPTOMS
FEVER: 0
MYALGIAS: 1
SENSORY CHANGE: 1
CHILLS: 0
TINGLING: 1
FALLS: 0
BRUISES/BLEEDS EASILY: 0
WEAKNESS: 0
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
## 2020-10-09 ENCOUNTER — HOSPITAL ENCOUNTER (OUTPATIENT)
Dept: LAB | Facility: MEDICAL CENTER | Age: 35
End: 2020-10-09
Payer: COMMERCIAL

## 2020-10-09 LAB
COVID ORDER STATUS COVID19: NORMAL
SARS-COV-2 RNA RESP QL NAA+PROBE: NOTDETECTED
SPECIMEN SOURCE: NORMAL

## 2021-01-05 NOTE — TELEPHONE ENCOUNTER
Impression: Other vitreous opacities, bilateral: H43.393. Plan: All signs/symptoms and risks of retinal detachment and tears were discussed in detail. Patient instructed to call office immediately if any symptoms noted. Phone Number Called: [phone] (home)     Message: LVM for patient to give us a call back to advise of results.     Left Message for patient to call back: yes

## 2023-04-20 ENCOUNTER — OFFICE VISIT (OUTPATIENT)
Dept: MEDICAL GROUP | Facility: PHYSICIAN GROUP | Age: 38
End: 2023-04-20
Payer: COMMERCIAL

## 2023-04-20 ENCOUNTER — APPOINTMENT (OUTPATIENT)
Dept: RADIOLOGY | Facility: IMAGING CENTER | Age: 38
End: 2023-04-20
Attending: FAMILY MEDICINE
Payer: COMMERCIAL

## 2023-04-20 VITALS
HEART RATE: 99 BPM | DIASTOLIC BLOOD PRESSURE: 80 MMHG | OXYGEN SATURATION: 96 % | SYSTOLIC BLOOD PRESSURE: 124 MMHG | TEMPERATURE: 98.4 F | HEIGHT: 72 IN | RESPIRATION RATE: 14 BRPM | WEIGHT: 199 LBS | BODY MASS INDEX: 26.95 KG/M2

## 2023-04-20 DIAGNOSIS — G89.29 CHRONIC LEFT SHOULDER PAIN: ICD-10-CM

## 2023-04-20 DIAGNOSIS — M25.512 CHRONIC LEFT SHOULDER PAIN: ICD-10-CM

## 2023-04-20 DIAGNOSIS — K21.9 GASTROESOPHAGEAL REFLUX DISEASE, UNSPECIFIED WHETHER ESOPHAGITIS PRESENT: ICD-10-CM

## 2023-04-20 DIAGNOSIS — Z00.00 PREVENTATIVE HEALTH CARE: ICD-10-CM

## 2023-04-20 DIAGNOSIS — F17.210 CIGARETTE NICOTINE DEPENDENCE WITHOUT COMPLICATION: ICD-10-CM

## 2023-04-20 DIAGNOSIS — Z78.9: ICD-10-CM

## 2023-04-20 DIAGNOSIS — Z11.59 NEED FOR HEPATITIS C SCREENING TEST: ICD-10-CM

## 2023-04-20 PROBLEM — D22.5 MELANOCYTIC NEVI OF TRUNK: Status: RESOLVED | Noted: 2018-10-11 | Resolved: 2023-04-20

## 2023-04-20 PROCEDURE — 99214 OFFICE O/P EST MOD 30 MIN: CPT | Performed by: FAMILY MEDICINE

## 2023-04-20 PROCEDURE — 73030 X-RAY EXAM OF SHOULDER: CPT | Mod: TC,LT | Performed by: FAMILY MEDICINE

## 2023-04-20 RX ORDER — VARENICLINE TARTRATE
KIT
Qty: 1 EACH | Refills: 0 | Status: SHIPPED | OUTPATIENT
Start: 2023-04-20 | End: 2023-09-15

## 2023-04-20 ASSESSMENT — PATIENT HEALTH QUESTIONNAIRE - PHQ9: CLINICAL INTERPRETATION OF PHQ2 SCORE: 0

## 2023-04-20 NOTE — PROGRESS NOTES
"CHIEF COMPLAINT / REASON FOR VISIT  Barbara Bennett is a 37 y.o. male that presents today to [facility] care.    HISTORY OF PRESENT ILLNESS  GERD for 10 years, gets bad reflux.   Occasional dysphagia of pharyngeal phase.  Denies unintentional weight loss or night sweats.    Left lateral shoulder pain, wakes him up at night due to pain. Picking up \"really heavy shit\" at work all the time. Aggravated by reaching motions. Almost 1 year duration. Slightly worsening. No history of surgery or injury. \"Hands go to sleep\" when he wakes up from sleep.  Uses advil occasionally, kratom occasionally.     Is trying to cut back on cigarettes    6 beers per day    Past Medical History  none    Past Surgical History:   Procedure Laterality Date    FOREIGN BODY REMOVAL Right 6/14/2017    Procedure: FOREIGN BODY REMOVAL - ELBOW;  Surgeon: Bryce Min M.D.;  Location: SURGERY [facility];  Service:     MASS EXCISION ORTHO Right 6/14/2017    Procedure: MASS EXCISION ORTHO - OLECRANON;  Surgeon: Bryce Min M.D.;  Location: SURGERY [facility];  Service:     OTHER ORTHOPEDIC SURGERY Right 2002    OIRF right leg     Social History     Tobacco Use    Smoking status: Every Day     Packs/day: 1.00     Years: 20.00     Pack years: 20.00     Types: Cigarettes    Smokeless tobacco: Former     Types: Chew   Substance Use Topics    Alcohol use: Yes     Comment: 6 per sara    Drug use: Yes     Types: Inhaled, Oral     Comment: canabis daily last time this morning    No other recreational drug     Mom -thyroid problems    OBJECTIVE    /80 (BP Location: Left arm, Patient Position: Sitting, BP Cuff Size: Adult)   Pulse 99   Temp 36.9 °C (98.4 °F) (Temporal)   Resp 14   Ht 1.829 m (6')   Wt 90.3 kg (199 lb)   SpO2 96%   BMI 26.99 kg/m²     PHYSICAL EXAM  Constitutional: Sitting comfortably, in no acute distress, responds to questions appropriately.  Head: Normocephalic  Eyes:  No conjunctival injection, no " scleral icterus, PERRL  Ears: External ear canals clear, TMs pearly grey with visualized bony landmarks and crisp light reflex  Mouth: Oral mucosa moist. Good dentition  Throat: Oropharynx clear without erythema or tonsillar exudates  Neck: No cervical or supraclavicular lymphadenopathy  Heart: Regular S1 S2, no murmurs, rub, or gallops  Lungs: Clear to auscultation bilaterally, no wheezes, rales, or rhonchi  Abdomen: Soft, nontender, nondistended  Extremities: No lower extremity edema. 2+ symmetric radial pulses  Skin: Warm and dry, no rashes or lesions on face or exposed upper extremities  Left Shoulder:   -- Inspection: No erythema, scars, obvious deformities, or atrophy.  -- No tenderness palpation  -- Active range of motion: 180 degrees shoulder flexion, 180 abduction, 80 external rotation, midthoracic internal  -- Normal sensation to light touch in upper extremities  -- 5/5 muscle strength in shoulder external rotation, internal rotation, abduction. 5/5 in biceps flexion and extension  -- +2 radial pulses bilaterally, normal capillary refill  -- Special tests: Positive Coker, mildly positive Neer at 120, negative empty Can, negative drop Arm, negative Speed's, negative Yergason's, negative liftoff, positive Bulloch's, negative apprehension test, negative crank test    ASSESSMENT & PLAN  1. Cigarette nicotine dependence without complication  He is interested in quitting, after discussion decided to start Chantix.  - Varenicline Tartrate, Starter, (CHANTIX STARTING MONTH ALE) 0.5 MG X 11 & 1 MG X 42 Tablet Therapy Pack; Follow directions on box  Dispense: 1 Each; Refill: 0    2. Gastroesophageal reflux disease, unspecified whether esophagitis present  Chronic severe GERD symptoms, dependent on omeprazole 40 mg daily.  Recommend that he see gastroenterology for consideration of EGD.  - Referral to Gastroenterology    3. Chronic left shoulder pain  Suspect rotator cuff tendinopathy, particularly supraspinatus.   We will proceed with physical therapy referral, obtain radiographs to rule out gross abnormality.  Could consider subacromial steroid injection if patient is interested  - Referral to Physical Therapy  - DX-SHOULDER 2+ LEFT; Future    4. Preventative health care  - Lipid Profile; Future  - Comp Metabolic Panel; Future  - CBC WITH DIFFERENTIAL; Future  - TSH WITH REFLEX TO FT4; Future  - APOLIPOPROTEIN B; Future    He declines immunizations.

## 2023-09-15 ENCOUNTER — OFFICE VISIT (OUTPATIENT)
Dept: MEDICAL GROUP | Facility: PHYSICIAN GROUP | Age: 38
End: 2023-09-15
Payer: COMMERCIAL

## 2023-09-15 VITALS
HEART RATE: 71 BPM | OXYGEN SATURATION: 96 % | DIASTOLIC BLOOD PRESSURE: 76 MMHG | HEIGHT: 72 IN | TEMPERATURE: 98.4 F | BODY MASS INDEX: 27.22 KG/M2 | WEIGHT: 201 LBS | SYSTOLIC BLOOD PRESSURE: 118 MMHG

## 2023-09-15 DIAGNOSIS — N50.812 PAIN IN LEFT TESTICLE: ICD-10-CM

## 2023-09-15 DIAGNOSIS — R10.32 LLQ ABDOMINAL PAIN: ICD-10-CM

## 2023-09-15 PROCEDURE — 99213 OFFICE O/P EST LOW 20 MIN: CPT

## 2023-09-15 PROCEDURE — 3078F DIAST BP <80 MM HG: CPT

## 2023-09-15 PROCEDURE — 3074F SYST BP LT 130 MM HG: CPT

## 2023-09-15 ASSESSMENT — ENCOUNTER SYMPTOMS
ABDOMINAL PAIN: 1
BLOOD IN STOOL: 0
DIARRHEA: 0
CONSTIPATION: 0
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
## 2023-09-15 NOTE — ASSESSMENT & PLAN NOTE
"Acute, unstable. Onset 3 weeks ago. Reports pain from testicle, radiates to left groin and flank. 4/10 intensity, reported as \"uncomfortable\". No known injury, but does lift heavy materials/very physically demanding job. No swelling/change in size or feel of testicle reported. Reports pain is worse with BM, nothing makes pain better.   PE unremarkable to testicle/abdomen  Will get us abdomen   "

## 2023-10-27 ENCOUNTER — APPOINTMENT (OUTPATIENT)
Dept: RADIOLOGY | Facility: MEDICAL CENTER | Age: 38
End: 2023-10-27
Payer: COMMERCIAL

## 2024-05-03 ENCOUNTER — DOCUMENTATION (OUTPATIENT)
Dept: HEALTH INFORMATION MANAGEMENT | Facility: OTHER | Age: 39
End: 2024-05-03
Payer: COMMERCIAL

## 2024-12-07 ENCOUNTER — APPOINTMENT (OUTPATIENT)
Dept: RADIOLOGY | Facility: IMAGING CENTER | Age: 39
End: 2024-12-07
Attending: PHYSICIAN ASSISTANT
Payer: COMMERCIAL

## 2024-12-07 ENCOUNTER — OCCUPATIONAL MEDICINE (OUTPATIENT)
Dept: URGENT CARE | Facility: PHYSICIAN GROUP | Age: 39
End: 2024-12-07
Payer: COMMERCIAL

## 2024-12-07 VITALS
WEIGHT: 222.3 LBS | OXYGEN SATURATION: 97 % | SYSTOLIC BLOOD PRESSURE: 138 MMHG | BODY MASS INDEX: 30.11 KG/M2 | RESPIRATION RATE: 20 BRPM | DIASTOLIC BLOOD PRESSURE: 88 MMHG | TEMPERATURE: 99.4 F | HEART RATE: 86 BPM | HEIGHT: 72 IN

## 2024-12-07 DIAGNOSIS — S39.012A STRAIN OF LUMBAR REGION, INITIAL ENCOUNTER: ICD-10-CM
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
## 2024-12-07 DIAGNOSIS — S46.911A STRAIN OF RIGHT SHOULDER, INITIAL ENCOUNTER: ICD-10-CM

## 2024-12-07 DIAGNOSIS — M54.50 LUMBAR PAIN: ICD-10-CM

## 2024-12-07 DIAGNOSIS — S56.911A ELBOW STRAIN, RIGHT, INITIAL ENCOUNTER: ICD-10-CM

## 2024-12-07 PROCEDURE — 73080 X-RAY EXAM OF ELBOW: CPT | Mod: TC,RT | Performed by: RADIOLOGY

## 2024-12-07 PROCEDURE — 72100 X-RAY EXAM L-S SPINE 2/3 VWS: CPT | Mod: TC | Performed by: RADIOLOGY

## 2024-12-07 PROCEDURE — 73030 X-RAY EXAM OF SHOULDER: CPT | Mod: TC,RT | Performed by: RADIOLOGY

## 2024-12-07 PROCEDURE — 99214 OFFICE O/P EST MOD 30 MIN: CPT | Performed by: PHYSICIAN ASSISTANT

## 2024-12-07 RX ORDER — ACETAMINOPHEN 500 MG
1000 TABLET ORAL ONCE
Status: COMPLETED | OUTPATIENT
Start: 2024-12-07 | End: 2024-12-07

## 2024-12-07 RX ORDER — DEXAMETHASONE SODIUM PHOSPHATE 10 MG/ML
10 INJECTION INTRAMUSCULAR; INTRAVENOUS ONCE
Status: COMPLETED | OUTPATIENT
Start: 2024-12-07 | End: 2024-12-07

## 2024-12-07 RX ORDER — NAPROXEN 500 MG/1
500 TABLET ORAL 2 TIMES DAILY WITH MEALS
Qty: 30 TABLET | Refills: 0 | Status: SHIPPED | OUTPATIENT
Start: 2024-12-07

## 2024-12-07 RX ORDER — METHYLPREDNISOLONE 4 MG/1
4 TABLET ORAL DAILY
Qty: 21 TABLET | Refills: 0 | Status: SHIPPED | OUTPATIENT
Start: 2024-12-07

## 2024-12-07 RX ADMIN — Medication 1000 MG: at 14:16

## 2024-12-07 RX ADMIN — DEXAMETHASONE SODIUM PHOSPHATE 10 MG: 10 INJECTION INTRAMUSCULAR; INTRAVENOUS at 14:16

## 2024-12-07 NOTE — LETTER
EMPLOYEE’S CLAIM FOR COMPENSATION/ REPORT OF INITIAL TREATMENT  FORM C-4  PLEASE TYPE OR PRINT    EMPLOYEE’S CLAIM - PROVIDE ALL INFORMATION REQUESTED   First Name                    LORETA Jimenez                  Last Name  Hill Birthdate                    1985                Sex  Male Claim Number (Insurer’s Use Only)     Home Address  [address] [place] [address]  Age  39 y.o. Height  1.829 m (6') Weight  101 kg (222 lb 4.8 oz) Social Security Number     [facility] Zip  [address] Telephone  [phone] (home)    Mailing Address  [address] [place] [address] Zip  [address] Primary Language Spoken  English    INSURER   THIRD-PARTY   Mariam Assigned Risk   Employee's Occupation (Job Title) When Injury or Occupational Disease Occurred      Employer's Name/Company Name    [employer]  Telephone   [phone]   Office Mail Address (Number and Street)  [address] [place] [address]   Date of Injury (if applicable) 12/7/2024               Hours Injury (if applicable)  10:30 AM Date Employer Notified  12/7/2024 Last Day of Work after Injury or Occupational Disease  12/7/2024 Supervisor to Whom Injury Reported  Stu Borges   Address or Location of Accident (if applicable)  Work [1]   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 (Be specific and answer in detail. Use additional sheet if necessary)  Leg cought in Rebar & fell 15 feet to back   If you believe that you have an occupational disease, when did you first have knowledge of the disability and its relationship to your employment?  N/A Witnesses to the Accident (if applicable)  Kai Love      Nature of Injury or Occupational Disease  Crushing  Part(s) of Body Injured or Affected  Lower Back Area (Lumbar Area & Lumbo-Sacral) Shoulder  (R) Elbow (R)    I CERTIFY THAT THE ABOVE IS TRUE AND CORRECT TO T HE BEST OF MY KNOWLEDGE AND THAT I HAVE PROVIDED THIS INFORMATION IN ORDER TO OBTAIN THE BENEFITS OF [place]CC[place], OR [place]).  I HEREBY AUTHORIZE ANY PHYSICIAN, CHIROPRACTOR, SURGEON, PRACTITIONER OR ANY OTHER PERSON, ANY HOSPITAL, INCLUDING [facility] OR [facility], ANY  MEDICAL SERVICE ORGANIZATION, ANY INSURANCE COMPANY, OR OTHER INSTITUTION OR ORGANIZATION TO RELEASE TO EACH OTHER, ANY MEDICAL OR OTHER INFORMATION, INCLUDING BENEFITS PAID OR PAYABLE, PERTINENT TO THIS INJURY OR DISEASE, EXCEPT INFORMATION RELATIVE TO DIAGNOSIS, TREATMENT AND/OR COUNSELING FOR AIDS, PSYCHOLOGICAL CONDITIONS, ALCOHOL OR CONTROLLED SUBSTANCES, FOR WHICH I MUST GIVE SPECIFIC AUTHORIZATION.  A PHOTOSTAT OF THIS AUTHORIZATION SHALL BE VALID AS THE ORIGINAL.     Date 12/07/2024   Place [facility]  Employee’s Original or  *Electronic Signature   THIS REPORT MUST BE COMPLETED AND MAILED WITHIN 3 WORKING DAYS OF TREATMENT   Place  [facility] [place]    Name of Facility  [facility]   Date 12/7/2024 Diagnosis and Description of Injury or Occupational Disease  (W17.89XA) Fall from height of greater than 3 feet  (S39.012A) Strain of lumbar region, initial encounter  (S46.911A) Strain of right shoulder, initial encounter  (S56.911A) Elbow strain, right, initial encounter  (Y99.0) Work related injury  Diagnoses of Fall from height of greater than 3 feet, Strain of lumbar region, initial encounter, Strain of right shoulder, initial encounter, Elbow strain, right, initial encounter, and Work related injury were pertinent to this visit. Is there evidence that the injured employee was under the influence of alcohol and/or another controlled substance at the time of accident?  []No  [] Yes (if yes, please explain)   Hour 1:49 PM  No   Treatment: Provide the  patient 1000 mg of Tylenol and 10 mg oral Decadron in office today for pain relief as he did take 400 mg of Advil 4 hours ago.  We obtained x-ray of the lumbar spine, right shoulder and right elbow all were negative for acute bony pathology.  Heart, lung and abdominal exam were normal today.  We will trial Medrol Dosepak, Zanaflex, naproxen for pain relief.  Return to the urgent care for reevaluation on 12/12/2024.  Strict ER precautions discussed      Have you advised the patient to remain off work five days or more?   [] Yes Indicate dates: From   To    [] No      If no, is the injured employee capable of: [] full duty [] modified duty                     If modified duty, specify any limitations / restrictions:  See D39                                                                                                                                                                                                                                                                                                                                                                                                               X-Ray Findings: Negative    From information given by the employee, together with medical evidence, can you directly connect this injury or occupational disease as job incurred?  []Yes   [] No Yes    Is additional medical care by a physician indicated? []Yes [] No  Yes    Do you know of any previous injury or disease contributing to this condition or occupational disease? []Yes [] No (Explain if yes)                          No   Date  12/7/2024 Print Health Care Provider’s Name  Emeterio Dominguez P.A.-C. I certify that the employer’s copy of  this form was delivered to the employer on:   Address  [id number] INSURER'S USE ONLY                       [facility]  [address] Provider’s Tax ID Number  [id number]   Telephone  Dept: [phone]    Health Care Provider’s Original or  Electronic Signature  e-MARITZA Ang P.A.-C. Degree (MD,DO, DC,KG,APRN)  PABELLE  Choose (if applicable)      ORIGINAL - TREATING HEALTHCARE PROVIDER PAGE 2 - INSURER/TPA PAGE 3 - EMPLOYER PAGE 4 - EMPLOYEE             Form C-4 (rev.08/23)

## 2024-12-07 NOTE — LETTER
PHYSICIAN’S AND CHIROPRACTIC PHYSICIAN'S   PROGRESS REPORT   CERTIFICATION OF DISABILITY Claim Number:     Social Security Number:    Patient’s Name: Barbara Bennett Date of Injury: 12/7/2024   Employer:   Name of MCO (if applicable):      Patient’s Job Description/Occupation:        Previous Injuries/Diseases/Surgeries Contributing to the Condition:         Diagnosis: (W17.89XA) Fall from height of greater than 3 feet  (S39.012A) Strain of lumbar region, initial encounter  (S46.911A) Strain of right shoulder, initial encounter  (S56.911A) Elbow strain, right, initial encounter  (Y99.0) Work related injury      Related to the Industrial Injury? Yes     Explain: DOI: 12/7/2024   NAILA: Leg caught on rebar, patient fell roughly 15 feet and landed directly onto his right lumbar area, right shoulder and right elbow.    Initial visit:  Presents today, ambulating under his own weight and power, for the above-stated injury on the above-stated date.  He denies loss consciousness with the impact.  He immediately felt some mild discomfort in his right shoulder, right elbow and low back but overall did not feel any severe pain.  His symptoms did begin to worsen after he drove home from the job site and got out of his car, also notes some tingling in his fingers and toes.  These worsen symptoms did prompt him to follow-up in the urgent care today.  He denies any neck pain, no chest pain, no abdominal pain.  No shortness of breath or difficulties with breathing.  He did take 800 mg of Advil roughly 4 hours ago after the injury.  No secondary occupation, no predisposing injuries.      Objective Medical Findings: Examination of the lumbar spine does reveal tenderness palpation over the right sided lumbar paraspinal musculature, lumbar spinous processes and the right-sided SI joint.  No bony step-off deformities noted.  Examination of the right shoulder shows tenderness over the proximal humerus and deltoid region,  shoulder range of motion is within normal limits but painful.  Examination of the right elbow does reveal significant tenderness over the lateral epicondyle, elbow flexion and extension normal but painful.  Upper extremity dermatome testing normal.  Regular rate and rhythm, lung auscultation was normal today, no rales, wheezes, rhonchi.  No abdominal tenderness         None - Discharged                         Stable  No                 Ratable  No        Generally Improved                         Condition Worsened                  Condition Same  May Have Suffered a Permanent Disability No     Treatment Plan:    Provide the patient 1000 mg of Tylenol and 10 mg oral Decadron in office today for pain relief as he did take 400 mg of Advil 4 hours ago.  We obtained x-ray of the lumbar spine, right shoulder and right elbow all were negative for acute bony pathology.  Heart, lung and abdominal exam were normal today.  We will trial Medrol Dosepak, Zanaflex, naproxen for pain relief.  Return to the urgent care for reevaluation on 12/12/2024.  Strict ER precautions discussed         No Change in Therapy                  PT/OT Prescribed                      Medication May be Used While Working        Case Management                          PT/OT Discontinued    Consultation    Further Diagnostic Studies:    Prescription(s)           Medrol Dosepak, Zanaflex, naproxen     Released to FULL DUTY /No Restrictions on (Date):       Certified TOTALLY TEMPORARILY DISABLED (Indicate Dates) From:   To:    X  Released to RESTRICTED/Modified Duty on (Date): From: 12/7/2024 To: 12/12/2024  Restrictions Are:  Temporary      No Sitting    No Standing    No Pulling Other: Patient will need to remain out of work from 12/7/2024 - 12/12/2024       No Bending at Waist     No Stooping     No Lifting        No Carrying     No Walking Lifting Restricted to (lbs.):          No Pushing        No Climbing     No Reaching Above Shoulders        Date of Next Visit:  12/12/2024 Date of this Exam: 12/7/2024 Physician/Chiropractic Physician Name: Emeterio Dominguez P.A.-C. Physician/Chiropractic Physician Signature:  Avi Lebron DO MPH     [place]:  [facility]  [facility], [address] [place], [place] [address] - Telephone [phone] [place]:  [address] [place] [address], [address] [place], [place] [address] - Telephone [phone]    [email or web address]  D-39 (Rev. 10/24)

## 2024-12-07 NOTE — LETTER
PHYSICIAN’S AND CHIROPRACTIC PHYSICIAN'S   PROGRESS REPORT   CERTIFICATION OF DISABILITY Claim Number:     Social Security Number:    Patient’s Name: Barbara Bennett Date of Injury: 12/7/2024   Employer: [employer]  Name of MCO (if applicable):      Patient’s Job Description/Occupation:        Previous Injuries/Diseases/Surgeries Contributing to the Condition:         Diagnosis: (W17.89XA) Fall from height of greater than 3 feet  (S39.012A) Strain of lumbar region, initial encounter  (S46.911A) Strain of right shoulder, initial encounter  (S56.911A) Elbow strain, right, initial encounter  (Y99.0) Work related injury      Related to the Industrial Injury? Yes     Explain: DOI: 12/7/2024   NAILA: Leg caught on rebar, patient fell roughly 15 feet and landed directly onto his right lumbar area, right shoulder and right elbow.    Initial visit:  Presents today, ambulating under his own weight and power, for the above-stated injury on the above-stated date.  He denies loss consciousness with the impact.  He immediately felt some mild discomfort in his right shoulder, right elbow and low back but overall did not feel any severe pain.  His symptoms did begin to worsen after he drove home from the job site and got out of his car, also notes some tingling in his fingers and toes.  These worsen symptoms did prompt him to follow-up in the urgent care today.  He denies any neck pain, no chest pain, no abdominal pain.  No shortness of breath or difficulties with breathing.  He did take 800 mg of Advil roughly 4 hours ago after the injury.  No secondary occupation, no predisposing injuries.      Objective Medical Findings: Examination of the lumbar spine does reveal tenderness palpation over the right sided lumbar paraspinal musculature, lumbar spinous processes and the right-sided SI joint.  No bony step-off deformities noted.  Examination of the right shoulder shows tenderness over the proximal humerus and  deltoid region, shoulder range of motion is within normal limits but painful.  Examination of the right elbow does reveal significant tenderness over the lateral epicondyle, elbow flexion and extension normal but painful.  Upper extremity dermatome testing normal.  Regular rate and rhythm, lung auscultation was normal today, no rales, wheezes, rhonchi.  No abdominal tenderness         None - Discharged                         Stable  No                 Ratable  No        Generally Improved                         Condition Worsened                  Condition Same  May Have Suffered a Permanent Disability No     Treatment Plan:    Provide the patient 1000 mg of Tylenol and 10 mg oral Decadron in office today for pain relief as he did take 400 mg of Advil 4 hours ago.  We obtained x-ray of the lumbar spine, right shoulder and right elbow all were negative for acute bony pathology.  Heart, lung and abdominal exam were normal today.  We will trial Medrol Dosepak, Zanaflex, naproxen for pain relief.  Return to the urgent care for reevaluation on 12/12/2024.  Strict ER precautions discussed         No Change in Therapy                  PT/OT Prescribed                      Medication May be Used While Working        Case Management                          PT/OT Discontinued    Consultation    Further Diagnostic Studies:    Prescription(s)           Medrol Dosepak, Zanaflex, naproxen     Released to FULL DUTY /No Restrictions on (Date):       Certified TOTALLY TEMPORARILY DISABLED (Indicate Dates) From:   To:    X  Released to RESTRICTED/Modified Duty on (Date): From: 12/7/2024 To: 12/12/2024  Restrictions Are:  Temporary      No Sitting X   No Standing X   No Pulling Other:     X    No Bending at Waist X    No Stooping X    No Lifting    X    No Carrying X    No Walking Lifting Restricted to (lbs.):  < or = to 10 pounds    X   No Pushing     X   No Climbing  X   No Reaching Above Shoulders       Date of Next  Visit:  12/12/2024 @ Date of this Exam: 12/7/2024 Physician/Chiropractic Physician Name: Emeterio Dominguez P.A.-C. Physician/Chiropractic Physician Signature:  Avi Lebron DO MPH     [place]:  [facility]  [facility], [address] [place], [place] [address] - Telephone [phone] [place]:  [address] [place] [address], [address] [place], [place] [address] - Telephone [phone]    [email or web address]  D-39 (Rev. 10/24)

## 2024-12-07 NOTE — LETTER
[facility] [place]  [address] [facility] [address]  [facility] [address]     December 7, 2024    Patient: Barbara Bennett   YOB: 1985   Date of Visit: 12/7/2024       To Whom It May Concern:    Barbara Bennett was seen and treated in our department on 12/7/2024.  Due to work related injury please excuse him from scheduled counseling visits from 12/7/2024 - 12/12/2024, can return to normal scheduled counseling visits after that date    Sincerely,     Emeterio Dominguez P.A.-C.

## 2024-12-07 NOTE — PROGRESS NOTES
Subjective:     Barbara Bennett is a 39 y.o. male who presents for Back Pain (Fell today pain in the right side )         2DOI: 12/7/2024   NAILA: Leg caught on rebar, patient fell roughly 15 feet and landed directly onto his right lumbar area, right shoulder and right elbow.    Initial visit:  Presents today, ambulating under his own weight and power, for the above-stated injury on the above-stated date.  He denies loss consciousness with the impact.  He immediately felt some mild discomfort in his right shoulder, right elbow and low back but overall did not feel any severe pain.  His symptoms did begin to worsen after he drove home from the job site and got out of his car, also notes some tingling in his fingers and toes.  These worsen symptoms did prompt him to follow-up in the urgent care today.  He denies any neck pain, no chest pain, no abdominal pain.  No shortness of breath or difficulties with breathing.  He did take 800 mg of Advil roughly 4 hours ago after the injury.  No secondary occupation, no predisposing injuries.    PMH:   No pertinent past medical history to this problem  MEDS:  Medications were reviewed in EMR  ALLERGIES:  Allergies were reviewed in EMR  SOCH[employer]:  Works as a   FH:   No pertinent family history to this problem       Objective:     /88   Pulse 86   Temp 37.4 °C (99.4 °F) (Temporal)   Resp 20   Ht 1.829 m (6')   Wt 101 kg (222 lb 4.8 oz)   SpO2 97%   BMI 30.15 kg/m²     Examination of the lumbar spine does reveal tenderness palpation over the right sided lumbar paraspinal musculature, lumbar spinous processes and the right-sided SI joint.  No bony step-off deformities noted.  Examination of the right shoulder shows tenderness over the proximal humerus and deltoid region, shoulder range of motion is within normal limits but painful.  Examination of the right elbow does reveal significant tenderness over the lateral epicondyle, elbow flexion and extension normal  but painful.  Upper extremity dermatome testing normal.  Regular rate and rhythm, lung auscultation was normal today, no rales, wheezes, rhonchi.  No abdominal tenderness      Diagnostic:    Right shoulder x-ray series  Radiologist IMPRESSION:  No radiographic evidence of acute traumatic injury.    Right elbow x-ray series  Radiologist IMPRESSION:  No radiographic evidence of acute traumatic injury.    Lumbar x-ray series  Radiologist IMPRESSION:  At least mild disc degeneration. No acute osseous abnormality.    Assessment/Plan:     Encounter Diagnoses   Name Primary?    Fall from height of greater than 3 feet     Strain of lumbar region, initial encounter     Strain of right shoulder, initial encounter     Elbow strain, right, initial encounter     Work related injury          Plan:  Provide the patient 1000 mg of Tylenol and 10 mg oral Decadron in office today for pain relief as he did take 400 mg of Advil 4 hours ago.  We obtained x-ray of the lumbar spine, right shoulder and right elbow all were negative for acute bony pathology.  Heart, lung and abdominal exam were normal today.  We will trial Medrol Dosepak, Zanaflex, naproxen for pain relief.  Return to the urgent care for reevaluation on 12/12/2024.  Strict ER precautions discussed    Differential diagnosis, natural history, supportive care, and indications for immediate follow-up discussed.    Emeterio Dominguez PA-C

## 2024-12-07 NOTE — LETTER
EMPLOYEE’S CLAIM FOR COMPENSATION/ REPORT OF INITIAL TREATMENT  FORM C-4  PLEASE TYPE OR PRINT    EMPLOYEE’S CLAIM - PROVIDE ALL INFORMATION REQUESTED   First Name                    LORETA Jimenez                  Last Name  Hill Birthdate                    1985                Sex  Male Claim Number (Insurer’s Use Only)     Home Address  [address] [place] [address]  Age  39 y.o. Height  1.829 m (6') Weight  101 kg (222 lb 4.8 oz) Social Security Number     [facility] Zip  [address] Telephone  [phone] (home)    Mailing Address  [address] [place] [address] Zip  [address] Primary Language Spoken  English    INSURER  *** THIRD-PARTY   Mariam Assigned Risk   Employee's Occupation (Job Title) When Injury or Occupational Disease Occurred      Employer's Name/Company Name     Telephone      Office Mail Address (Number and Street)  [address]     Date of Injury (if applicable) 12/7/2024               Hours Injury (if applicable)  10:30 AM Date Employer Notified  12/7/2024 Last Day of Work after Injury or Occupational Disease  12/7/2024 Supervisor to Whom Injury Reported  Stu Borges   Address or Location of Accident (if applicable)  Work [1]   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 (Be specific and answer in detail. Use additional sheet if necessary)  Leg cought in [place] & fell 15 feet to back   If you believe that you have an occupational disease, when did you first have knowledge of the disability and its relationship to your employment?  N/A Witnesses to the Accident (if applicable)  Kai Love      Nature of Injury or Occupational Disease  Crushing  Part(s) of Body Injured or Affected  Lower Back Area (Lumbar Area & Lumbo-Sacral) Shoulder (R) Elbow (R)    I CERTIFY THAT THE ABOVE  IS TRUE AND CORRECT TO T HE BEST OF MY KNOWLEDGE AND THAT I HAVE PROVIDED THIS INFORMATION IN ORDER TO OBTAIN THE BENEFITS OF [place], OR CHAPTER [place]).  I HEREBY AUTHORIZE ANY PHYSICIAN, CHIROPRACTOR, SURGEON, PRACTITIONER OR ANY OTHER PERSON, ANY HOSPITAL, INCLUDING [facility] OR [facility], ANY  MEDICAL SERVICE ORGANIZATION, ANY INSURANCE COMPANY, OR OTHER INSTITUTION OR ORGANIZATION TO RELEASE TO EACH OTHER, ANY MEDICAL OR OTHER INFORMATION, INCLUDING BENEFITS PAID OR PAYABLE, PERTINENT TO THIS INJURY OR DISEASE, EXCEPT INFORMATION RELATIVE TO DIAGNOSIS, TREATMENT AND/OR COUNSELING FOR AIDS, PSYCHOLOGICAL CONDITIONS, ALCOHOL OR CONTROLLED SUBSTANCES, FOR WHICH I MUST GIVE SPECIFIC AUTHORIZATION.  A PHOTOSTAT OF THIS AUTHORIZATION SHALL BE VALID AS THE ORIGINAL.     Date   Place Employee’s Original or  *Electronic Signature   THIS REPORT MUST BE COMPLETED AND MAILED WITHIN 3 WORKING DAYS OF TREATMENT   Place  [facility]    Name of Facility  [employer]   Date 12/7/2024 Diagnosis and Description of Injury or Occupational Disease  (W17.89XA) Fall from height of greater than 3 feet  (S39.012A) Strain of lumbar region, initial encounter  (S46.911A) Strain of right shoulder, initial encounter  (S56.911A) Elbow strain, right, initial encounter  (Y99.0) Work related injury  Diagnoses of Fall from height of greater than 3 feet, Strain of lumbar region, initial encounter, Strain of right shoulder, initial encounter, Elbow strain, right, initial encounter, and Work related injury were pertinent to this visit. Is there evidence that the injured employee was under the influence of alcohol and/or another controlled substance at the time of accident?  []No  [] Yes (if yes, please explain)   Hour 1:49 PM  No   Treatment: Provide the patient 1000 mg of Tylenol and 10 mg oral Decadron in office today  for pain relief as he did take 400 mg of Advil 4 hours ago.  We obtained x-ray of the lumbar spine, right shoulder and right elbow all were negative for acute bony pathology.  Heart, lung and abdominal exam were normal today.  We will trial Medrol Dosepak, Zanaflex, naproxen for pain relief.  Return to the urgent care for reevaluation on 12/12/2024.  Strict ER precautions discussed      Have you advised the patient to remain off work five days or more?   [] Yes Indicate dates: From 12/7/2024 To 12/12/2024  [] No      If no, is the injured employee capable of: [] full duty [] modified duty                     If modified duty, specify any limitations / restrictions:                                                                                                                                                                                                                                                                                                                                                                                                                  X-Ray Findings: Negative    From information given by the employee, together with medical evidence, can you directly connect this injury or occupational disease as job incurred?  []Yes   [] No Yes    Is additional medical care by a physician indicated? []Yes [] No  Yes    Do you know of any previous injury or disease contributing to this condition or occupational disease? []Yes [] No (Explain if yes)                          No   Date  12/7/2024 Print Health Care Provider’s Name  Maritza Dominguez P.A.-C. I certify that the employer’s copy of  this form was delivered to the employer on:   Address  [address] [place] [address]. [address] INSURER'S USE ONLY                       [facility]  [id number] Provider’s Tax ID Number  [id number]   Telephone  Dept: [phone]    Health Care Provider’s Original or Electronic Signature  e-MARITZA Ang P.A.-C. Degree  (MD,DO, DC,KG,APRN)  {Provider Degrees:[id number]}  Choose (if applicable)      ORIGINAL - TREATING HEALTHCARE PROVIDER PAGE 2 - INSURER/TPA PAGE 3 - EMPLOYER PAGE 4 - EMPLOYEE             Form C-4 (rev.08/23)

## 2024-12-12 ENCOUNTER — OCCUPATIONAL MEDICINE (OUTPATIENT)
Dept: URGENT CARE | Facility: PHYSICIAN GROUP | Age: 39
End: 2024-12-12
Payer: COMMERCIAL

## 2024-12-12 ENCOUNTER — APPOINTMENT (OUTPATIENT)
Dept: RADIOLOGY | Facility: IMAGING CENTER | Age: 39
End: 2024-12-12
Payer: COMMERCIAL

## 2024-12-12 VITALS
RESPIRATION RATE: 16 BRPM | TEMPERATURE: 98.4 F | WEIGHT: 222 LBS | BODY MASS INDEX: 30.07 KG/M2 | HEART RATE: 99 BPM | SYSTOLIC BLOOD PRESSURE: 134 MMHG | OXYGEN SATURATION: 97 % | DIASTOLIC BLOOD PRESSURE: 84 MMHG | HEIGHT: 72 IN

## 2024-12-12 DIAGNOSIS — S29.9XXA RIB INJURY: ICD-10-CM

## 2024-12-12 DIAGNOSIS — S39.012A STRAIN OF LUMBAR REGION, INITIAL ENCOUNTER: ICD-10-CM

## 2024-12-12 DIAGNOSIS — S46.911D STRAIN OF RIGHT SHOULDER, SUBSEQUENT ENCOUNTER: ICD-10-CM

## 2024-12-12 DIAGNOSIS — S56.911D ELBOW STRAIN, RIGHT, SUBSEQUENT ENCOUNTER: ICD-10-CM

## 2024-12-12 DIAGNOSIS — S56.911A ELBOW STRAIN, RIGHT, INITIAL ENCOUNTER: ICD-10-CM

## 2024-12-12 DIAGNOSIS — S46.911A STRAIN OF RIGHT SHOULDER, INITIAL ENCOUNTER: ICD-10-CM

## 2024-12-12 DIAGNOSIS — S39.012D STRAIN OF LUMBAR REGION, SUBSEQUENT ENCOUNTER: ICD-10-CM

## 2024-12-12 PROCEDURE — 3075F SYST BP GE 130 - 139MM HG: CPT

## 2024-12-12 PROCEDURE — 3079F DIAST BP 80-89 MM HG: CPT

## 2024-12-12 PROCEDURE — 71111 X-RAY EXAM RIBS/CHEST4/> VWS: CPT | Mod: TC | Performed by: RADIOLOGY

## 2024-12-12 PROCEDURE — 99214 OFFICE O/P EST MOD 30 MIN: CPT

## 2024-12-12 RX ORDER — LIDOCAINE 50 MG/G
1 PATCH TOPICAL EVERY 12 HOURS
Qty: 5 PATCH | Refills: 0 | Status: SHIPPED | OUTPATIENT
Start: 2024-12-12 | End: 2024-12-15

## 2024-12-12 ASSESSMENT — VISUAL ACUITY: OU: 1

## 2024-12-12 NOTE — LETTER
PHYSICIAN’S AND CHIROPRACTIC PHYSICIAN'S   PROGRESS REPORT   CERTIFICATION OF DISABILITY Claim Number:     Social Security Number:    Patient’s Name: Barbara Bennett Date of Injury: 12/7/2024   Employer:  [employer]  Name of MCO (if applicable):      Patient’s Job Description/Occupation:        Previous Injuries/Diseases/Surgeries Contributing to the Condition:         Diagnosis: (S56.911D) Elbow strain, right, subsequent encounter  (S39.012D) Strain of lumbar region, subsequent encounter  (S46.911D) Strain of right shoulder, subsequent encounter  (S29.9XXA) Rib injury      Related to the Industrial Injury?   Yes         Objective Medical Findings:  Examination of the lumbar spine does reveal tenderness to palpation over the right sided lumbar paraspinal musculature, lumbar spinous processes and the right-sided SI joint.  No bony step-off deformities noted.  Examination of the right shoulder shows tenderness over the proximal humerus and deltoid region, shoulder range of motion is within normal limits but painful.  Examination of the right elbow does reveal significant tenderness over the lateral epicondyle, elbow flexion and extension normal but painful.  Upper extremity dermatome testing normal.  Regular rate and rhythm, lung auscultation was normal today, no rales, wheezes, rhonchi.  No abdominal tenderness. TTP right anterior intercostal space. Abdominal examination without hernia or tenderness. No carissa's or grey malone's sign.          None - Discharged                       X  Stable                    Ratable           Generally Improved                         Condition Worsened                 X Condition Same  May Have Suffered a Permanent Disability  No      Treatment Plan:     Tylenol, Zanaflex, lidocaine patch PRN. Maintain work restrictions. FU with Occupational Medicine.          No Change in Therapy                  PT/OT Prescribed                      Medication May be Used  While Working        Case Management                          PT/OT Discontinued    Consultation    Further Diagnostic Studies:    Prescription(s)       [id number]-Hwskjagft      Lidocaine Patch PRN      Released to FULL DUTY /No Restrictions on (Date):       Certified TOTALLY TEMPORARILY DISABLED (Indicate Dates) From:   To:      Released to RESTRICTED/Modified Duty on (Date): From:  12/12/24 To:  12/18/24.   Restrictions Are:   Temporary.        No Sitting   X No Standing   X No Pulling Other:       X  No Bending at Waist  X   No Stooping   X  No Lifting      X  No Carrying   X  No Walking Lifting Restricted to (lbs.):   <10 pounds       X No Pushing       X No Climbing    X No Reaching Above Shoulders       Date of Next Visit:   12/18/24 (if unable to get into Occupational Medicine).  @4pm [facility]  Date of this Exam: 12/12/2024 Physician/Chiropractic Physician Name: DAVID Nuñez Physician/Chiropractic Physician Signature:  Avi Lebron DO MPH     [place]:  [address] [place] [address], [address] [place], [place] [address] - Telephone [phone] [place]:  [address] [place] [address], [address] [place], [place] [address] - Telephone [phone]    [email or web address]  D-39 (Rev. 10/24)

## 2024-12-12 NOTE — PROGRESS NOTES
"Subjective:     Barbara Bennett is a 39 y.o. male who presents for Work-Related Injury (DOI 12/7/2024 Pt states still feels the same )    HPI:   Copied from previous visit (12/7/24): \"Presents today, ambulating under his own weight and power, for the above-stated injury on the above-stated date. He denies loss consciousness with the impact. He immediately felt some mild discomfort in his right shoulder, right elbow and low back but overall did not feel any severe pain. His symptoms did begin to worsen after he drove home from the job site and got out of his car, also notes some tingling in his fingers and toes. These worsen symptoms did prompt him to follow-up in the urgent care today. He denies any neck pain, no chest pain, no abdominal pain. No shortness of breath or difficulties with breathing. He did take 800 mg of Advil roughly 4 hours ago after the injury. No secondary occupation, no predisposing injuries.\"     12/12/24 - Patient reports no improvement in right lumbar pain, right shoulder pain, and right elbow pain. Reports finishing a course of PO steroids. He has attempted Zanaflex for relief. Reports tenderness to right anterior rib cage. Denies shortness of breath or vomiting. No bulging of abdomen. No ecchymosis. Denies blood in stool. No abdominal pain. Reports intermittent tingling in bilateral arms and legs. LBM today. Patient has been tolerating current work restrictions.        PMH:   No pertinent past medical history to this problem  MEDS:  Medications were reviewed in EMR  ALLERGIES:  Allergies were reviewed in EMR   FH:   No pertinent family history to this problem     Objective:     /84 (BP Location: Right arm, Patient Position: Sitting, BP Cuff Size: Adult)   Pulse 99   Temp 36.9 °C (98.4 °F) (Temporal)   Resp 16   Ht 1.829 m (6')   Wt 101 kg (222 lb)   SpO2 97%   BMI 30.11 kg/m²     Physical Exam  Vitals reviewed.   Constitutional:       General: He is not in acute " distress.  HENT:      Nose: Nose normal.   Eyes:      General: Vision grossly intact. Gaze aligned appropriately. No visual field deficit.     Extraocular Movements: Extraocular movements intact.      Conjunctiva/sclera: Conjunctivae normal.      Pupils: Pupils are equal, round, and reactive to light.      Right eye: Pupil is round, reactive and not sluggish.      Left eye: Pupil is round, reactive and not sluggish.      Funduscopic exam:     Right eye: Red reflex present.         Left eye: Red reflex present.  Cardiovascular:      Rate and Rhythm: Normal rate and regular rhythm.      Pulses:           Radial pulses are 2+ on the right side and 2+ on the left side.      Heart sounds: Normal heart sounds.   Pulmonary:      Effort: Pulmonary effort is normal. No tachypnea, accessory muscle usage, prolonged expiration, respiratory distress or retractions.      Breath sounds: No stridor, decreased air movement or transmitted upper airway sounds. No wheezing, rhonchi or rales.   Abdominal:      Palpations: Abdomen is soft. There is no mass.      Tenderness: There is no abdominal tenderness. There is no right CVA tenderness, left CVA tenderness, guarding or rebound.      Hernia: No hernia is present.      Comments: No Sudarshan's or grey malone's sign.    Musculoskeletal:      Cervical back: Full passive range of motion without pain, normal range of motion and neck supple. No swelling, deformity, erythema, rigidity, spasms, tenderness, bony tenderness or crepitus. No pain with movement, spinous process tenderness or muscular tenderness. Normal range of motion.      Thoracic back: No swelling, deformity, spasms, tenderness or bony tenderness. Normal range of motion.      Lumbar back: Spasms and tenderness present. No swelling, deformity or bony tenderness. Decreased range of motion.      Comments: Examination of the lumbar spine does reveal tenderness to palpation over the right sided lumbar paraspinal musculature, lumbar  spinous processes and the right-sided SI joint.  No bony step-off deformities noted.  Examination of the right shoulder shows tenderness over the proximal humerus and deltoid region, shoulder range of motion is within normal limits but painful.  Examination of the right elbow does reveal significant tenderness over the lateral epicondyle, elbow flexion and extension normal but painful.  Upper extremity dermatome testing normal.  Regular rate and rhythm, lung auscultation was normal today, no rales, wheezes, rhonchi.  No abdominal tenderness.      Skin:     General: Skin is warm and dry.      Capillary Refill: Capillary refill takes less than 2 seconds.      Findings: No rash.   Neurological:      General: No focal deficit present.      Mental Status: He is alert. Mental status is at baseline.      Cranial Nerves: Cranial nerves 2-12 are intact.      Sensory: Sensation is intact.      Motor: Motor function is intact.      Coordination: Coordination is intact.      Gait: Gait is intact.      Deep Tendon Reflexes: Reflexes are normal and symmetric.   Psychiatric:         Mood and Affect: Mood normal.         Behavior: Behavior normal.         Thought Content: Thought content normal.       DX-ELBOW-COMPLETE 3+ RIGHT    Result Date: 12/7/2024 12/7/2024 2:25 PM HISTORY/REASON FOR EXAM:  Pain/Deformity Following Trauma. . TECHNIQUE/EXAM DESCRIPTION AND NUMBER OF VIEWS:  3 views of the RIGHT elbow. COMPARISON: 2/19/2013 FINDINGS: There is no definite fracture, dislocation, or significant joint effusion.     No radiographic evidence of acute traumatic injury.    DX-LUMBAR SPINE-2 OR 3 VIEWS    Result Date: 12/7/2024 12/7/2024 2:25 PM HISTORY/REASON FOR EXAM:  Pain Following Trauma. Fall TECHNIQUE/ EXAM DESCRIPTION AND NUMBER OF VIEWS:  3 views of the lumbar spine. COMPARISON: None. FINDINGS: Normal lumbar lordosis.  There is no acute fracture or dislocation. There is preservation of the vertebral body alignment.  Mild to  moderate disc degeneration L1-L2 with disc narrowing and diffuse disc osteophyte. Mild disc degeneration L3-L4. Bone mineralization is age-appropriate.     At least mild disc degeneration. No acute osseous abnormality.    DX-SHOULDER 2+ RIGHT    Result Date: 12/7/2024 12/7/2024 2:25 PM HISTORY/REASON FOR EXAM:  Pain/Deformity Following Trauma. TECHNIQUE/EXAM DESCRIPTION AND NUMBER OF VIEWS:  3 views of the RIGHT shoulder. COMPARISON: None FINDINGS: There is no definite fracture or dislocation. There is no significant acromioclavicular or glenohumeral osteoarthrosis. If symptoms persist or worsen, MRI may be considered to further evaluate.     No radiographic evidence of acute traumatic injury.      Assessment/Plan:     1. Elbow strain, right, subsequent encounter  - Referral to Occupational Medicine    2. Strain of lumbar region, subsequent encounter  - Referral to Occupational Medicine  - lidocaine (LIDODERM) 5 % Patch; Place 1 Patch on the skin every 12 hours for 3 days.  Dispense: 5 Patch; Refill: 0    3. Strain of right shoulder, subsequent encounter  - Referral to Occupational Medicine    4. Rib injury  - [id number] (WITH 1-VIEW CXR); Future  - Referral to Occupational Medicine    5. Strain of lumbar region, initial encounter  - tizanidine (ZANAFLEX) 4 MG Tab; Take 1 Tablet by mouth every 6 hours as needed (Muscle Spasm).  Dispense: 15 Tablet; Refill: 0    6. Strain of right shoulder, initial encounter  - tizanidine (ZANAFLEX) 4 MG Tab; Take 1 Tablet by mouth every 6 hours as needed (Muscle Spasm).  Dispense: 15 Tablet; Refill: 0    7. Elbow strain, right, initial encounter  - tizanidine (ZANAFLEX) 4 MG Tab; Take 1 Tablet by mouth every 6 hours as needed (Muscle Spasm).  Dispense: 15 Tablet; Refill: 0    Released to RESTRICTED Duty FROM 12/12/24 TO 12/18/24.   Restrictions: No bending at waist, carrying, pushing, standing, stooping, walking, climbing, pulling, lifting, reaching.   Referral to  Occupational Medicine placed   Tylenol, Zanaflex, lidocaine patch PRN. Maintain work restrictions. FU with Occupational Medicine. Do not use Zanaflex while at work.    *Strict emergency room precautions given. Patient verbalizes understanding of when to go to emergency room or call 911.       Illness progression and alarm symptoms discussed with patient, emphasizing low threshold for returning to clinic/emergency department for worsening symptoms. Patient is agreeable to the plan and verbalizes understanding, and will follow up if warranted.       Differential diagnosis, natural history, supportive care, and indications for immediate follow-up discussed.     Follow-up as needed if symptoms worsen or fail to improve to PCP, Urgent care or Emergency Room.                         Electronically signed by VITALY Kang

## 2024-12-18 ENCOUNTER — OCCUPATIONAL MEDICINE (OUTPATIENT)
Dept: URGENT CARE | Facility: PHYSICIAN GROUP | Age: 39
End: 2024-12-18
Payer: COMMERCIAL

## 2024-12-18 VITALS
OXYGEN SATURATION: 96 % | HEIGHT: 72 IN | HEART RATE: 82 BPM | TEMPERATURE: 97.4 F | RESPIRATION RATE: 20 BRPM | BODY MASS INDEX: 28.85 KG/M2 | WEIGHT: 213 LBS | DIASTOLIC BLOOD PRESSURE: 70 MMHG | SYSTOLIC BLOOD PRESSURE: 118 MMHG

## 2024-12-18 DIAGNOSIS — S46.911D STRAIN OF RIGHT SHOULDER, SUBSEQUENT ENCOUNTER: ICD-10-CM

## 2024-12-18 DIAGNOSIS — S56.911D ELBOW STRAIN, RIGHT, SUBSEQUENT ENCOUNTER: ICD-10-CM
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
## 2024-12-18 DIAGNOSIS — S39.012D STRAIN OF LUMBAR REGION, SUBSEQUENT ENCOUNTER: ICD-10-CM

## 2024-12-18 PROCEDURE — 3078F DIAST BP <80 MM HG: CPT | Performed by: STUDENT IN AN ORGANIZED HEALTH CARE EDUCATION/TRAINING PROGRAM

## 2024-12-18 PROCEDURE — 99213 OFFICE O/P EST LOW 20 MIN: CPT | Performed by: STUDENT IN AN ORGANIZED HEALTH CARE EDUCATION/TRAINING PROGRAM

## 2024-12-18 PROCEDURE — 3074F SYST BP LT 130 MM HG: CPT | Performed by: STUDENT IN AN ORGANIZED HEALTH CARE EDUCATION/TRAINING PROGRAM

## 2024-12-18 NOTE — LETTER
PHYSICIAN’S AND CHIROPRACTIC PHYSICIAN'S   PROGRESS REPORT   CERTIFICATION OF DISABILITY Claim Number:     Social Security Number:    Patient’s Name: Barbara Bennett Date of Injury: 12/7/2024   Employer:  [employer] Name of MCO (if applicable):      Patient’s Job Description/Occupation:        Previous Injuries/Diseases/Surgeries Contributing to the Condition:         Diagnosis: (W17.89XA) Fall from height of greater than 3 feet  (S46.911D) Strain of right shoulder, subsequent encounter  (S56.911D) Elbow strain, right, subsequent encounter  (S39.012D) Strain of lumbar region, subsequent encounter      Related to the Industrial Injury? Yes     Explain:        Objective Medical Findings: Diffuse tenderness to the lumbar back primarily over the right lumbar musculature.  No midline step-offs or crepitus.  Full range of motion of the back but pain with movement.  Pain with flex position/sitting.  Full range of motion of the shoulders and elbows.         None - Discharged                         Stable  No                 Ratable  No        Generally Improved                         Condition Worsened               X   Condition Same  May Have Suffered a Permanent Disability No     Treatment Plan:    Since presentation physical exam findings are consistent with multiple contusions and lumbar muscle strain.  Main concern is his lumbar back at this time.  Patient referred to occupational medicine provided with their phone number to give them a call and make sure that he is scheduled.  Will add on Voltaren topical in hopes that this may reduce some of his discomfort.  Okay to have him off work for the next 5 days as he has not had good healing at this point.  Continue with restrictions following that.  No red flag signs.  No signs of cauda equina.         No Change in Therapy                  PT/OT Prescribed                      Medication May be Used While Working        Case Management                           PT/OT Discontinued    Consultation    Further Diagnostic Studies:    Prescription(s) Patient provided with occupational medicine referral number.  Patient advised to contact them to schedule his next appointment.               Released to FULL DUTY /No Restrictions on (Date):       Certified TOTALLY TEMPORARILY DISABLED (Indicate Dates) From:   To:    X  Released to RESTRICTED/Modified Duty on (Date): From: 12/23/2024 To: 1/13/2025  Restrictions Are:         No Sitting    No Standing    No Pulling Other: No walking or standing for greater than 1 hour at a time without a break.  Sessions will be put in place from 12/23/2024 to 1/13/2025.  Patient removed from work entirely from 12/18/2024 to 12/23/2024 to allow for additional healing.       No Bending at Waist     No Stooping     No Lifting        No Carrying     No Walking Lifting Restricted to (lbs.):          No Pushing     X   No Climbing     No Reaching Above Shoulders       Date of Next Visit:   12/30/2024   Occupational Health Date of this Exam: 12/18/2024 Physician/Chiropractic Physician Name: Bunny De La Rosa P.A.-C. Physician/Chiropractic Physician Signature:  Avi Lebron DO MPH     [place]:  [address] [place] [address], [address] [place], [place] [address] - Telephone [phone] [place]:  [address]  [facility], [address] [place], [place] [address] - Telephone [phone]    [email or web address]  D-39 (Rev. 10/24)

## 2024-12-18 NOTE — LETTER
PHYSICIAN’S AND CHIROPRACTIC PHYSICIAN'S   PROGRESS REPORT   CERTIFICATION OF DISABILITY Claim Number:     Social Security Number:    Patient’s Name: Barbara Bennett Date of Injury: 12/7/2024   Employer:   Name of MCO (if applicable):      Patient’s Job Description/Occupation:        Previous Injuries/Diseases/Surgeries Contributing to the Condition:         Diagnosis: (W17.89XA) Fall from height of greater than 3 feet  (S46.911D) Strain of right shoulder, subsequent encounter  (S56.911D) Elbow strain, right, subsequent encounter  (S39.012D) Strain of lumbar region, subsequent encounter      Related to the Industrial Injury? Yes     Explain:        Objective Medical Findings: Diffuse tenderness to the lumbar back primarily over the right lumbar musculature.  No midline step-offs or crepitus.  Full range of motion of the back but pain with movement.  Pain with flex position/sitting.  Full range of motion of the shoulders and elbows.         None - Discharged                         Stable  No                 Ratable  No        Generally Improved                         Condition Worsened               X   Condition Same  May Have Suffered a Permanent Disability No     Treatment Plan:              No Change in Therapy                  PT/OT Prescribed                      Medication May be Used While Working        Case Management                          PT/OT Discontinued    Consultation    Further Diagnostic Studies:    Prescription(s) Patient provided with occupational medicine referral number.  Patient advised to contact them to schedule his next appointment.               Released to FULL DUTY /No Restrictions on (Date):       Certified TOTALLY TEMPORARILY DISABLED (Indicate Dates) From:   To:    X  Released to RESTRICTED/Modified Duty on (Date): From: 12/23/2024 To: 1/13/2025  Restrictions Are:      X   No Sitting X   No Standing X   No Pulling Other: No walking or standing for greater than 1  hour at a time without a break.  Sessions will be put in place from 12/23/2024 to 1/13/2025.  Patient removed from work entirely from 12/18/2024 to 12/23/2024 to allow for additional healing.   X    No Bending at Waist X    No Stooping     No Lifting    X    No Carrying X    No Walking Lifting Restricted to (lbs.):       X   No Pushing     X   No Climbing     No Reaching Above Shoulders       Date of Next Visit:    Date of this Exam: 12/18/2024 Physician/Chiropractic Physician Name: Bunny De La Rosa P.A.-C. Physician/Chiropractic Physician Signature:  Avi Lebron DO MPH     [place]:  [address] [place] [address], [address] [place], [place] [address] - Telephone [phone] [place]:  [address] [place] [address], [address] [place], [place] [address] - Telephone [phone]    [email or web address]  D-39 (Rev. 10/24)

## 2024-12-19 NOTE — PROGRESS NOTES
Subjective:     Barbara Bennett is a 39 y.o. male who presents for Work-Related Injury (DOI 12/07/24 lower back,painful)    39-year-old male presents to urgent care for work-related injury that occurred on 12/7/2024.  Fell from approximately 10 feet.  Still having pain to the lumbar back, right shoulder, and right elbow.  Has tried muscle relaxers, anti-inflammatories, lidocaine patches, and steroid course.  Has not had improvement since his last visit.  Was referred to occupational medicine for follow-up visit.  Has not heard from this referral at this point.      PMH:   No pertinent past medical history to this problem  MEDS:  Medications were reviewed in EMR  ALLERGIES:  Allergies were reviewed in EMR  FH:   No pertinent family history to this problem       Objective:     /70 (BP Location: Right arm, Patient Position: Sitting, BP Cuff Size: Adult)   Pulse 82   Temp 36.3 °C (97.4 °F) (Temporal)   Resp 20   Ht 1.829 m (6')   Wt 96.6 kg (213 lb)   SpO2 96%   BMI 28.89 kg/m²     Diffuse tenderness to the lumbar back primarily over the right lumbar musculature.  No midline step-offs or crepitus.  Full range of motion of the back but pain with movement.  Pain with flex position/sitting.  Full range of motion of the shoulders and elbows.    Assessment/Plan:       1. Fall from height of greater than 3 feet    2. Strain of right shoulder, subsequent encounter    3. Elbow strain, right, subsequent encounter    4. Strain of lumbar region, subsequent encounter      FROM   TO    No walking or standing for greater than 1 hour at a time without a break.  Sessions will be put in place from 12/23/2024 to 1/13/2025.  Patient removed from work entirely from 12/18/2024 to 12/23/2024 to allow for additional healing.     Since presentation physical exam findings are consistent with multiple contusions and lumbar muscle strain.  Main concern is his lumbar back at this time.  Patient referred to occupational medicine  provided with their phone number to give them a call and make sure that he is scheduled.  Will add on Voltaren topical in hopes that this may reduce some of his discomfort.  Okay to have him off work for the next 5 days as he has not had good healing at this point.  Continue with restrictions following that.  No red flag signs.  No signs of cauda equina.     Differential diagnosis, natural history, supportive care, and indications for immediate follow-up discussed.

## 2025-01-08 ENCOUNTER — OCCUPATIONAL MEDICINE (OUTPATIENT)
Dept: OCCUPATIONAL MEDICINE | Facility: CLINIC | Age: 40
End: 2025-01-08
Payer: COMMERCIAL

## 2025-01-08 VITALS
WEIGHT: 215 LBS | DIASTOLIC BLOOD PRESSURE: 70 MMHG | HEART RATE: 80 BPM | TEMPERATURE: 97.5 F | BODY MASS INDEX: 29.12 KG/M2 | RESPIRATION RATE: 16 BRPM | SYSTOLIC BLOOD PRESSURE: 100 MMHG | HEIGHT: 72 IN

## 2025-01-08 DIAGNOSIS — S56.911D STRAIN OF RIGHT ELBOW, SUBSEQUENT ENCOUNTER: ICD-10-CM

## 2025-01-08 DIAGNOSIS — S46.911D STRAIN OF RIGHT SHOULDER, SUBSEQUENT ENCOUNTER: ICD-10-CM
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
## 2025-01-08 DIAGNOSIS — S39.012D STRAIN OF LUMBAR REGION, SUBSEQUENT ENCOUNTER: ICD-10-CM

## 2025-01-08 PROCEDURE — 99214 OFFICE O/P EST MOD 30 MIN: CPT | Performed by: NURSE PRACTITIONER

## 2025-01-08 RX ORDER — LIDOCAINE 50 MG/G
1 PATCH TOPICAL EVERY 24 HOURS
Qty: 10 PATCH | Refills: 0 | Status: SHIPPED | OUTPATIENT
Start: 2025-01-08

## 2025-01-08 ASSESSMENT — PAIN SCALES - GENERAL: PAINLEVEL_OUTOF10: 7=MODERATE-SEVERE PAIN

## 2025-01-08 NOTE — LETTER
PHYSICIAN’S AND CHIROPRACTIC PHYSICIAN'S   PROGRESS REPORT   CERTIFICATION OF DISABILITY Claim Number:     Social Security Number:    Patient’s Name: Barbara Bennett Date of Injury: 12/7/2024   Employer:  [employer] Name of [employer] (if applicable):      Patient’s Job Description/Occupation:        Previous Injuries/Diseases/Surgeries Contributing to the Condition:         Diagnosis: (W17.89XA) Fall from height of greater than 3 feet  (S46.911D) Strain of right shoulder, subsequent encounter  (S56.911D) Strain of right elbow, subsequent encounter  (S39.012D) Strain of lumbar region, subsequent encounter      Related to the Industrial Injury? Yes     Explain: DOI: 12/7/2024 NAILA: Leg caught on rebar, patient fell roughly 15 feet and landed directly onto his right lumbar area, right shoulder and right elbow.       Objective Medical Findings: Back: Diffuse tenderness to the lumbar back primarily over the right lumbar musculature.  No midline step-offs or crepitus.  Full range of motion of the back but pain with movement.  Pain with flex position/sitting. SLR positive on the right, negative on the left. DTR's 2 +    Right upper extremity: No gross deformity discoloration noted.  Range of motion is intact with discomfort.  Distal neurovascular strength and sensation intact.  Subjective intermittent numbness and tingling with certain movements.  TTP to the olecranon.  Distal pulses are 2+ and intact.  Negative edema, erythema, or abnormal warmth to the joint.         None - Discharged                         Stable  Yes                 Ratable  No        Generally Improved                      X   Condition Worsened               X   Condition Same  May Have Suffered a Permanent Disability No     Treatment Plan:    Follow-up in 3 weeks, less seen by physiatry  Recommend continue with OTC Tylenol/ibuprofen, ice/heat application, gentle range of motion stretching as tolerated  Strict ED precautions discussed  with patient         No Change in Therapy               X   PT/OT Prescribed                      Medication May be Used While Working     X   Case Management                          PT/OT Discontinued  X  Consultation  X  Further Diagnostic Studies:    Prescription(s) Physiatry referral placed, transfer care  Physical therapy requested    MRI of the right shoulder, lumbar, and right elbow requested    Tizanidine as prescribed do not drive or work while taking this medication due to sedating effects  Lidocaine patches as prescribed   X  Released to FULL DUTY /No Restrictions on (Date):  1/8/2025    Certified TOTALLY TEMPORARILY DISABLED (Indicate Dates) From:   To:      Released to RESTRICTED/Modified Duty on (Date): From:   To:    Restrictions Are:         No Sitting    No Standing    No Pulling Other:         No Bending at Waist     No Stooping     No Lifting        No Carrying     No Walking Lifting Restricted to (lbs.):          No Pushing        No Climbing     No Reaching Above Shoulders       Date of Next Visit:   1/29/2025 at 7:30 AM Date of this Exam: 1/8/2025 Physician/Chiropractic Physician Name: DAVID Shepard Physician/Chiropractic Physician Signature:  Avi Lebron DO MPH     [place]:  [address] [place] [address], [address] [place], [place] [address] - Telephone [phone] [place]:  [address] [place] [address], [address] [place], [place] [address] - Telephone [phone]    [email or web address]  D-39 (Rev. 10/24)

## 2025-01-08 NOTE — PROGRESS NOTES
Subjective:     Barbara Bennett is a 39 y.o. male who presents for Other ((WC DOI: 12/07/2024/LOWER BACK/[employer]) RM17)       DOI: 12/7/2024 NAILA: Leg caught on rebar, patient fell roughly 15 feet and landed directly onto his right lumbar area, right shoulder and right elbow.     Today patient states everything is feeling worse there has been no improvement.  He states if he goes to move his arm towards his back or above shoulder height he gets numbness and tingling.  He has some weakness where he feels like his leg is almost dragging and catching on things at work.  He states if he twists his body a certain way he feels like he has to go to the bathroom.  He denies leg weakness, bowel or bladder changes, or saddle anesthesia.  He takes ibuprofen, ices, heat, Epsom salt baths, and Lidoderm patches, and the tizanidine and nothing helps with his pain.  He is no longer with the employer.  He will be placed on full duty at this time.  Physical therapy, physiatry, and MRIs requested at this time.  Plan of care discussed with patient.    ROS: All systems were reviewed on intake form, form was reviewed and signed. See scanned documents in media. Pertinent positives and negatives included in HPI.    PMH: No pertinent past medical history to this problem  MEDS: Medications were reviewed in Epic  ALLERGIES: No Known Allergies  SOCHX: No longer works as an  at [employer]  FH: No pertinent family history to this problem       Objective:     /70 (BP Location: Left arm, Patient Position: Sitting, BP Cuff Size: Adult)   Pulse 80   Temp 36.4 °C (97.5 °F)   Resp 16   Ht 1.829 m (6')   Wt 97.5 kg (215 lb)   BMI 29.16 kg/m²     [unfilled]    Back: Diffuse tenderness to the lumbar back primarily over the right lumbar musculature.  No midline step-offs or crepitus.  Full range of motion of the back but pain with movement.  Pain with flex position/sitting. SLR positive on the right, negative on the  left. DTR's 2 +    Right upper extremity: No gross deformity discoloration noted.  Range of motion is intact with discomfort.  Distal neurovascular strength and sensation intact.  Subjective intermittent numbness and tingling with certain movements.  TTP to the olecranon.  Distal pulses are 2+ and intact.  Negative edema, erythema, or abnormal warmth to the joint.    Assessment/Plan:       1. Fall from height of greater than 3 feet  - MR-LUMBAR SPINE-W/O; Future  - MR-ELBOW-W/O RIGHT; Future  - MR-SHOULDER-W/O RIGHT; Future  - Referral to Radiology  - Referral to Physical Therapy  - Referral to Pain Clinic  - tizanidine (ZANAFLEX) 4 MG Tab; Take 1 Tablet by mouth every 6 hours as needed (moderate pain).  Dispense: 30 Tablet; Refill: 0  - lidocaine (LIDODERM) 5 % Patch; Place 1 Patch on the skin every 24 hours.  Dispense: 10 Patch; Refill: 0    2. Strain of right shoulder, subsequent encounter  - MR-SHOULDER-W/O RIGHT; Future  - Referral to Radiology  - Referral to Physical Therapy  - Referral to Pain Clinic  - tizanidine (ZANAFLEX) 4 MG Tab; Take 1 Tablet by mouth every 6 hours as needed (moderate pain).  Dispense: 30 Tablet; Refill: 0  - lidocaine (LIDODERM) 5 % Patch; Place 1 Patch on the skin every 24 hours.  Dispense: 10 Patch; Refill: 0    3. Strain of right elbow, subsequent encounter  - MR-ELBOW-W/O RIGHT; Future  - Referral to Radiology  - Referral to Physical Therapy  - Referral to Pain Clinic  - tizanidine (ZANAFLEX) 4 MG Tab; Take 1 Tablet by mouth every 6 hours as needed (moderate pain).  Dispense: 30 Tablet; Refill: 0  - lidocaine (LIDODERM) 5 % Patch; Place 1 Patch on the skin every 24 hours.  Dispense: 10 Patch; Refill: 0    4. Strain of lumbar region, subsequent encounter  - MR-LUMBAR SPINE-W/O; Future  - Referral to Radiology  - Referral to Physical Therapy  - Referral to Pain Clinic  - tizanidine (ZANAFLEX) 4 MG Tab; Take 1 Tablet by mouth every 6 hours as needed (moderate pain).  Dispense: 30  Tablet; Refill: 0  - lidocaine (LIDODERM) 5 % Patch; Place 1 Patch on the skin every 24 hours.  Dispense: 10 Patch; Refill: 0              Differential diagnosis, natural history, supportive care, and indications for immediate follow-up discussed.    Approximately 30 minutes were spent in reviewing notes, preparing for visit, obtaining history, exam and evaluation, patient counseling/education and post visit documentation/orders.

## 2025-06-04 ENCOUNTER — APPOINTMENT (OUTPATIENT)
Dept: RADIOLOGY | Facility: IMAGING CENTER | Age: 40
End: 2025-06-04
Attending: PHYSICIAN ASSISTANT
Payer: COMMERCIAL

## 2025-06-04 ENCOUNTER — OFFICE VISIT (OUTPATIENT)
Dept: URGENT CARE | Facility: CLINIC | Age: 40
End: 2025-06-04
Payer: COMMERCIAL

## 2025-06-04 ENCOUNTER — APPOINTMENT (OUTPATIENT)
Dept: URGENT CARE | Facility: PHYSICIAN GROUP | Age: 40
End: 2025-06-04
Payer: COMMERCIAL

## 2025-06-04 VITALS
BODY MASS INDEX: 28.73 KG/M2 | OXYGEN SATURATION: 99 % | WEIGHT: 205.2 LBS | HEIGHT: 71 IN | RESPIRATION RATE: 16 BRPM | HEART RATE: 93 BPM | DIASTOLIC BLOOD PRESSURE: 70 MMHG | SYSTOLIC BLOOD PRESSURE: 122 MMHG | TEMPERATURE: 97.5 F

## 2025-06-04 DIAGNOSIS — R07.81 RIB PAIN ON LEFT SIDE: ICD-10-CM

## 2025-06-04 DIAGNOSIS — S30.1XXA CONTUSION OF ABDOMINAL WALL, INITIAL ENCOUNTER: ICD-10-CM

## 2025-06-04 DIAGNOSIS — S20.212A CONTUSION OF RIB ON LEFT SIDE, INITIAL ENCOUNTER: ICD-10-CM

## 2025-06-04 DIAGNOSIS — R07.81 RIB PAIN ON LEFT SIDE: Primary | ICD-10-CM

## 2025-06-04 PROCEDURE — 71101 X-RAY EXAM UNILAT RIBS/CHEST: CPT | Mod: TC,LT | Performed by: RADIOLOGY

## 2025-06-04 PROCEDURE — 99214 OFFICE O/P EST MOD 30 MIN: CPT | Performed by: PHYSICIAN ASSISTANT

## 2025-06-04 PROCEDURE — 3074F SYST BP LT 130 MM HG: CPT | Performed by: PHYSICIAN ASSISTANT

## 2025-06-04 PROCEDURE — 3078F DIAST BP <80 MM HG: CPT | Performed by: PHYSICIAN ASSISTANT

## 2025-06-04 RX ORDER — HYDROCODONE BITARTRATE AND ACETAMINOPHEN 5; 325 MG/1; MG/1
1 TABLET ORAL EVERY 6 HOURS PRN
Qty: 12 TABLET | Refills: 0 | Status: SHIPPED | OUTPATIENT
Start: 2025-06-04 | End: 2025-06-07

## 2025-06-04 NOTE — LETTER
June 4, 2025         Patient: Barbara Bennett   YOB: 1985   Date of Visit: 6/4/2025           To Whom it May Concern:    Barbara Bennett was seen in my clinic on 6/4/2025. Please excuse from work through 6/6/25    If you have any questions or concerns, please don't hesitate to call.        Sincerely,           Arthur Randolph P.A.-C.  Electronically Signed

## 2025-06-04 NOTE — PROGRESS NOTES
"Subjective:   Barbara Bennett is a 39 y.o. male who presents for Fall (Slipped and fell impact on the left side of his ribcage )      HPI  The patient presents to the Urgent Care with complaints of left rib pain and left lower abdominal wall pain onset this morning.  He slipped and fell and his left side of his body struck a keg.  Developed some bruising and pain.  Pain is worse with movements and deep breaths.  No hemoptysis.  He took Advil 600 mg this morning without much relief.  No other injury.  No other complaints.        Past Medical History[1]  Allergies[2]     Objective:     /70 (BP Location: Left arm, Patient Position: Sitting, BP Cuff Size: Adult)   Pulse 93   Temp 36.4 °C (97.5 °F) (Temporal)   Resp 16   Ht 1.803 m (5' 11\")   Wt 93.1 kg (205 lb 3.2 oz)   SpO2 99%   BMI 28.62 kg/m²     Physical Exam  Vitals reviewed.   Constitutional:       General: He is in acute distress (mild secondary to pain).      Appearance: Normal appearance. He is not ill-appearing or toxic-appearing.   Eyes:      Conjunctiva/sclera: Conjunctivae normal.   Cardiovascular:      Rate and Rhythm: Normal rate and regular rhythm.      Heart sounds: Normal heart sounds.   Pulmonary:      Effort: Pulmonary effort is normal. No respiratory distress.      Breath sounds: Normal breath sounds. No wheezing, rhonchi or rales.   Abdominal:      Palpations: Abdomen is soft. There is no hepatomegaly, splenomegaly or mass.          Comments: Tenderness to palpation to the left anterior lateral ribs.  Mild tenderness to palpation to the mid to left lower abdominal wall with minimal swelling, and superficial bruising.  No crepitus or obvious deformity.  Full and equal chest rise.   Musculoskeletal:      Cervical back: Neck supple.   Skin:     General: Skin is warm and dry.   Neurological:      General: No focal deficit present.      Mental Status: He is alert and oriented to person, place, and time.   Psychiatric:         Mood and " Affect: Mood normal.         Behavior: Behavior normal.         RADIOLOGY RESULTS   [id number] (WITH 1-VIEW CXR) LEFT  Result Date: 6/4/2025 6/4/2025 9:38 AM HISTORY/REASON FOR EXAM:  Pain Following Trauma. TECHNIQUE/EXAM DESCRIPTION AND NUMBER OF VIEWS:  3 images of the left ribs and chest. COMPARISON: 12/12/2024 chest x-ray FINDINGS: No fractures or acute bony changes are noted.  There is no evidence of a hemo or pneumothorax.     No obviously angulated or displaced rib fracture.         Diagnosis and associated orders:     1. Rib pain on left side  - [id number] (WITH 1-VIEW CXR) LEFT; Future  - HYDROcodone-acetaminophen (NORCO) 5-325 MG Tab per tablet; Take 1 Tablet by mouth every 6 hours as needed (severe pain) for up to 3 days.  Dispense: 12 Tablet; Refill: 0    2. Contusion of abdominal wall, initial encounter    3. Contusion of rib on left side, initial encounter    Other orders  - Consent for Opiate Prescription       Comments/MDM:     X-ray results per radiologist interpretation above. I personally reviewed images and radiologist report  Patient reports no improvement of pain from Ibuprofen 600 mg. He does appear to have significant amount of pain especially with palpation.   Recommend rest, ice application, Tylenol 1000 mg alternating with ibuprofen 600mg every 4 hours. Deep breathing exercised. Red flags discussed and indications to present to the Emergency Department.         I have reviewed prescription monitoring program for patient's narcotic use before prescribing a scheduled drug. The patient will not drink alcohol nor drive with prescribed medications. I'm prescribing controlled substances to this patient, I certify that I have obtained and reviewed the medical history this patient I have also made a good nallely effort to obtain applicable records from other providers who have treated the patient and records did not demonstrate any increased risk of substance abuse that would  prevent me from prescribing controlled substances. I have conducted a physical exam and documented it.Prescription Monitoring Program. Given the above, I believe the benefits of controlled substance therapy outweigh the risks. The reasons for prescribing controlled substances include in my professional opinion, controlled substances are a reasonable choice for this patient. Accordingly, I have discussed the risk and benefits, treatment plan, and alternative therapies with the patient. The patient has been consented for the medication and understands the risks. Patient signed consent form.        I personally reviewed prior external notes and test results pertinent to today's visit. Pathogenesis of diagnosis discussed including typical length and natural progression. Supportive care, natural history, differential diagnoses, and indications for immediate follow-up discussed. Patient expresses understanding and agrees to plan. Patient denies any other questions or concerns.     Follow-up with the primary care physician for recheck, reevaluation, and consideration of further management.    Please note that this dictation was created using voice recognition software. I have made a reasonable attempt to correct obvious errors, but I expect that there are errors of grammar and possibly content that I did not discover before finalizing the note.    This note was electronically signed by Arthur Randolph PA-C         [1]   Past Medical History:  Diagnosis Date    Heart burn     Indigestion     Pain 6/2017    right elbow   [2] No Known Allergies

## (undated) DEVICE — TUBING CLEARLINK DUO-VENT - C-FLO (48EA/CA)

## (undated) DEVICE — LEAD SET 6 DISP. EKG NIHON KOHDEN

## (undated) DEVICE — PACK LOWER EXTREMITY - (2/CA)

## (undated) DEVICE — GLOVE BIOGEL ECLIPSE PF LATEX SIZE 7.5

## (undated) DEVICE — CHLORAPREP 26 ML APPLICATOR - ORANGE TINT(25/CA)

## (undated) DEVICE — PADDING CAST 4 IN STERILE - 4 X 4 YDS (24/CA)

## (undated) DEVICE — SUTURE GENERAL

## (undated) DEVICE — NEPTUNE 4 PORT MANIFOLD - (20/PK)

## (undated) DEVICE — LACTATED RINGERS INJ 1000 ML - (14EA/CA 60CA/PF)

## (undated) DEVICE — KIT ANESTHESIA W/CIRCUIT & 3/LT BAG W/FILTER (20EA/CA)

## (undated) DEVICE — SODIUM CHL IRRIGATION 0.9% 1000ML (12EA/CA)

## (undated) DEVICE — DRAPE LARGE 3 QUARTER - (20/CA)

## (undated) DEVICE — SET EXTENSION WITH 2 PORTS (48EA/CA) ***PART #2C8610 IS A SUBSTITUTE*****

## (undated) DEVICE — TUBE E-T HI-LO CUFF 7.0MM (10EA/PK)

## (undated) DEVICE — HEAD HOLDER JUNIOR/ADULT

## (undated) DEVICE — GLOVE, BIOGEL ECLIPSE, SZ 7.0, PF LTX (50/BX)

## (undated) DEVICE — MASK ANESTHESIA ADULT  - (100/CA)

## (undated) DEVICE — DETERGENT RENUZYME PLUS 10 OZ PACKET (50/BX)

## (undated) DEVICE — SENSOR SPO2 NEO LNCS ADHESIVE (20/BX) SEE USER NOTES

## (undated) DEVICE — GLOVE BIOGEL INDICATOR SZ 8 SURGICAL PF LTX - (50/BX 4BX/CA)

## (undated) DEVICE — GLOVE BIOGEL SZ 8 SURGICAL PF LTX - (50PR/BX 4BX/CA)

## (undated) DEVICE — WATER IRRIG. STER. 1500 ML - (9/CA)

## (undated) DEVICE — STAPLER SKIN DISP - (6/BX 10BX/CA) VISISTAT

## (undated) DEVICE — CANISTER SUCTION 3000ML MECHANICAL FILTER AUTO SHUTOFF MEDI-VAC NONSTERILE LF DISP  (40EA/CA)

## (undated) DEVICE — SET LEADWIRE 5 LEAD BEDSIDE DISPOSABLE ECG (1SET OF 5/EA)

## (undated) DEVICE — PROTECTOR ULNA NERVE - (36PR/CA)

## (undated) DEVICE — GOWN WARMING STANDARD FLEX - (30/CA)

## (undated) DEVICE — GLOVE BIOGEL INDICATOR SZ 7.5 SURGICAL PF LTX - (50PR/BX 4BX/CA)

## (undated) DEVICE — SUTURE 2-0 MONOCRYL SH

## (undated) DEVICE — GLOVE BIOGEL SZ 7 SURGICAL PF LTX - (50PR/BX 4BX/CA)

## (undated) DEVICE — STOCKINET BIAS 4 IN STERILE - (20/CA)

## (undated) DEVICE — GLOVE BIOGEL SZ 7.5 SURGICAL PF LTX - (50PR/BX 4BX/CA)

## (undated) DEVICE — TOWELS CLOTH SURGICAL - (4/PK 20PK/CA)

## (undated) DEVICE — ELECTRODE DUAL RETURN W/ CORD - (50/PK)

## (undated) DEVICE — KIT ROOM DECONTAMINATION

## (undated) DEVICE — SUCTION INSTRUMENT YANKAUER BULBOUS TIP W/O VENT (50EA/CA)

## (undated) DEVICE — TOURNIQUET, STERILE 18 (RED)

## (undated) DEVICE — ELECTRODE 850 FOAM ADHESIVE - HYDROGEL RADIOTRNSPRNT (50/PK)